# Patient Record
Sex: MALE | Race: WHITE
[De-identification: names, ages, dates, MRNs, and addresses within clinical notes are randomized per-mention and may not be internally consistent; named-entity substitution may affect disease eponyms.]

---

## 2020-02-18 ENCOUNTER — HOSPITAL ENCOUNTER (INPATIENT)
Dept: HOSPITAL 12 - ER | Age: 75
LOS: 22 days | Discharge: NURSING, REHAB OR LONG-TERM CARE | DRG: 885 | End: 2020-03-11
Payer: MEDICARE

## 2020-02-18 VITALS — DIASTOLIC BLOOD PRESSURE: 74 MMHG | SYSTOLIC BLOOD PRESSURE: 139 MMHG

## 2020-02-18 VITALS — WEIGHT: 137 LBS | HEIGHT: 60 IN | BODY MASS INDEX: 26.9 KG/M2

## 2020-02-18 DIAGNOSIS — G30.9: ICD-10-CM

## 2020-02-18 DIAGNOSIS — Z79.899: ICD-10-CM

## 2020-02-18 DIAGNOSIS — F01.50: ICD-10-CM

## 2020-02-18 DIAGNOSIS — N17.9: ICD-10-CM

## 2020-02-18 DIAGNOSIS — Z91.19: ICD-10-CM

## 2020-02-18 DIAGNOSIS — E66.9: ICD-10-CM

## 2020-02-18 DIAGNOSIS — F31.9: Primary | ICD-10-CM

## 2020-02-18 DIAGNOSIS — F12.10: ICD-10-CM

## 2020-02-18 DIAGNOSIS — F02.80: ICD-10-CM

## 2020-02-18 DIAGNOSIS — Z87.828: ICD-10-CM

## 2020-02-18 DIAGNOSIS — G40.909: ICD-10-CM

## 2020-02-18 DIAGNOSIS — E55.9: ICD-10-CM

## 2020-02-18 LAB
ALP SERPL-CCNC: 87 U/L (ref 50–136)
ALT SERPL W/O P-5'-P-CCNC: 16 U/L (ref 16–63)
AMORPH PHOS CRY URNS QL MICRO: (no result) /HPF
AMPHETAMINES UR QL SCN>1000 NG/ML: NEGATIVE
APAP SERPL-MCNC: < 2 UG/ML (ref 10–30)
APPEARANCE UR: (no result)
AST SERPL-CCNC: 17 U/L (ref 15–37)
BARBITURATES UR QL SCN: NEGATIVE
BASOPHILS # BLD AUTO: 0 K/UL (ref 0–8)
BASOPHILS NFR BLD AUTO: 0.5 % (ref 0–2)
BILIRUB DIRECT SERPL-MCNC: 0.2 MG/DL (ref 0–0.2)
BILIRUB SERPL-MCNC: 0.5 MG/DL (ref 0.2–1)
BILIRUB UR QL STRIP: NEGATIVE
BUN SERPL-MCNC: 13 MG/DL (ref 7–18)
CHLORIDE SERPL-SCNC: 101 MMOL/L (ref 98–107)
CO2 SERPL-SCNC: 30 MMOL/L (ref 21–32)
COCAINE UR QL SCN: NEGATIVE
COLOR UR: YELLOW
CREAT SERPL-MCNC: 1.1 MG/DL (ref 0.6–1.3)
DEPRECATED SQUAMOUS URNS QL MICRO: (no result) /HPF
EOSINOPHIL # BLD AUTO: 0.1 K/UL (ref 0–0.7)
EOSINOPHIL NFR BLD AUTO: 1 % (ref 0–7)
ETHANOL SERPL-MCNC: < 3 MG/DL (ref 0–0)
GLUCOSE SERPL-MCNC: 91 MG/DL (ref 74–106)
GLUCOSE UR STRIP-MCNC: NEGATIVE MG/DL
HCT VFR BLD AUTO: 41.6 % (ref 36.7–47.1)
HGB BLD-MCNC: 13.9 G/DL (ref 12.5–16.3)
HGB UR QL STRIP: NEGATIVE
KETONES UR STRIP-MCNC: NEGATIVE MG/DL
LEUKOCYTE ESTERASE UR QL STRIP: NEGATIVE
LYMPHOCYTES # BLD AUTO: 1.6 K/UL (ref 20–40)
LYMPHOCYTES NFR BLD AUTO: 23.5 % (ref 20.5–51.5)
MCH RBC QN AUTO: 27.8 UUG (ref 23.8–33.4)
MCHC RBC AUTO-ENTMCNC: 33 G/DL (ref 32.5–36.3)
MCV RBC AUTO: 83.3 FL (ref 73–96.2)
MONOCYTES # BLD AUTO: 0.5 K/UL (ref 2–10)
MONOCYTES NFR BLD AUTO: 7.1 % (ref 0–11)
NEUTROPHILS # BLD AUTO: 4.6 K/UL (ref 1.8–8.9)
NEUTROPHILS NFR BLD AUTO: 67.9 % (ref 38.5–71.5)
NITRITE UR QL STRIP: NEGATIVE
OPIATES UR QL SCN: NEGATIVE
PCP UR QL SCN>25 NG/ML: NEGATIVE
PH UR STRIP: 7 [PH] (ref 5–8)
PLATELET # BLD AUTO: 212 K/UL (ref 152–348)
POTASSIUM SERPL-SCNC: 3.6 MMOL/L (ref 3.5–5.1)
RBC # BLD AUTO: 5 MIL/UL (ref 4.06–5.63)
RBC #/AREA URNS HPF: (no result) /HPF (ref 0–3)
SP GR UR STRIP: 1.02 (ref 1–1.03)
THC UR QL SCN>50 NG/ML: POSITIVE
UROBILINOGEN UR STRIP-MCNC: 0.2 E.U./DL
WBC # BLD AUTO: 6.7 K/UL (ref 3.6–10.2)
WBC #/AREA URNS HPF: (no result) /HPF
WBC #/AREA URNS HPF: (no result) /HPF (ref 0–3)
WS STN SPEC: 7.4 G/DL (ref 6.4–8.2)

## 2020-02-18 PROCEDURE — G0480 DRUG TEST DEF 1-7 CLASSES: HCPCS

## 2020-02-18 PROCEDURE — A4663 DIALYSIS BLOOD PRESSURE CUFF: HCPCS

## 2020-02-18 NOTE — NUR
Patient BIB pvt ambulance [facility] for med cl to gps admission. A/Ox2 to self and 
place. Speech is clear, speaks in complete sentences. Patient is 
semi-cooperative. Upon assessment patient was refusing care, but upon asking 
another time patient agreed to care pending to be provided. Respiratory even 
and unlabored, no cough no sob. No cardiovascular distress noted, denies any 
cp. Denies any n/v/d.



Patient in bed at lowest position, sr upx2, call light within reach. Fall 
precautions implemented per protocol.

## 2020-02-18 NOTE — NUR
Code grey called on patient d/t his attempt to elope from the facility. Patient 
tried leaving through the front door entrance of ER.

## 2020-02-18 NOTE — NUR
Patient has been seen leaving his room multiple times. Instructed patient to 
remain in his room, but pt continues to go in and out and wander. Nursing 
supervisor notified, no available sitters at this time.

## 2020-02-18 NOTE — NUR
hand off and SBAR received fr outgoing day shift rn (lanette)

pt is asleep 

w/ sitter at bedside 

pt is at RA

NAD

5150 in place

## 2020-02-18 NOTE — NUR
GPS ADMISSION NOTE:

 AT APPROX 2000, ADMITTED 75 YEARS OLD MALE FROM [facility]) TO [facility] 
MHU ON A 5150 FOR GD. PER HOLD, PATIENT HAS BEEN YELLING AT RESIDENTS AND STAFF, REFUSING TO 
TAKE HIS PSYCHOTROPIC MEDICATIONS AND HAS BEEN THREATENING 2 FEMALE RESIDENTS. PT DENIED, 
STATING, "THEY ARE LYING" AND MAKES OTHER PARANOID REFERENCE TO DOCTORS, AND THE "VEINS" 
HERE "I DON'T TRUST". PATIENT WAS MEDICALLY CLEARED AT [facility] WHERE HE TESTED POSITIVE 
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 PT WAS ASLO GIVEN ZYPREXA 10MG IM D/T ATTEMPTING TO ELOPE THE ER 
AND FOR AGITATION. HOLD WILL BE UP ON 2/21/2020 AT 1030. PT WAS ADMITTED UNDER DR. COSTELLO'S 
CARE.

UPON ADMISSION TO MHU, PATIENT WAS NOTED A/O X 1, CONFUSED, POOR HISTORIAN. APPEARS 
PREOCCUPIED, AND SUSPICIOUS. HE EXPRESSES DESIRE TO LEAVE THE UNIT. 

FACE TO FACE ASSESSMENT DONE, PATIENT APPEARS TO REFLECT WHAT IS WRITTEN ON THE HOLD. HE 
DENIES YELLING AT STAFF AND RESIDENTS AT HIS FACILITY.  MOOD IS ANXIOUS, AFFECT IS GUARDED, 
APPEARANCE IS POOR HYGIENE (DIRTY UNKEPT FINGERNAILS) AND BEHAVIOR IS SUSPICIOUS. 

PT WAS UNABLE TO SIGN ANY OF HIS ADMISSION PAPERS D/T CONFUSED AND AMS.

SKIN ASSESSMENT WAS LIMITED D/T PT POOR COMPLIANT; HOWEVER, PATIENT WAS NOTED WITH SWOLLEN 
LEFT GROIN AREA, POSSIBLE SWOLLEN LEFT TESTICLE. WILL F/U WITH PATIENT MEDICAL HISTORY. 

PATIENT'S RIGHTS HANDBOOK AND ADVISEMENT WAS GIVEN. PT WAS ORIENTED TO UNIT RULES, PHONE, 
RESTROOM, AND ROOM MATE. Q15 MIN HEAD CHECKS WAS INITIATED FOR SAFETY AND AWOL PRECAUTION.

## 2020-02-19 VITALS — SYSTOLIC BLOOD PRESSURE: 98 MMHG | DIASTOLIC BLOOD PRESSURE: 52 MMHG

## 2020-02-19 VITALS — DIASTOLIC BLOOD PRESSURE: 70 MMHG | SYSTOLIC BLOOD PRESSURE: 123 MMHG

## 2020-02-19 RX ADMIN — Medication SCH MG: at 20:37

## 2020-02-19 RX ADMIN — DIVALPROEX SODIUM SCH MG: 125 CAPSULE ORAL at 12:13

## 2020-02-19 RX ADMIN — DIVALPROEX SODIUM SCH MG: 125 CAPSULE ORAL at 20:37

## 2020-02-19 RX ADMIN — LORAZEPAM PRN MG: 0.5 TABLET ORAL at 13:19

## 2020-02-19 NOTE — NUR
Social Work/Coordination of Care:



 spoke with Angela  at [facility] 
([phone]) who confirmed that the patient will be able to return once stable and ready 
for discharge.

## 2020-02-19 NOTE — NUR
Social Work/Family Contact:



SW spoke with patient's daughter/DPJF Smith and collected collateral information 
regarding patient's history and recent behaviors.

## 2020-02-19 NOTE — NUR
Social Work/Initial Discharge Note:



Patient currently resides at [address], 
[place], [place] ([phone]). Patient will 

return upon discharge. Patient's daughter/DPOA Vidhi Smith ([phone]) is involved in 
the patient's treatment plan and care. SW will continue to work with patient, family, and MD 
to ensure a safe and proper discharge plan.

## 2020-02-19 NOTE — NUR
Received patient in his room, med compliant but takes his time, paranoid, guarded, 
isolative, and avoidant. AO x 2. No sign or symptom of resp. distress, no indication of pain 
or discomfort. Will continue to monitor patient for safety.

## 2020-02-20 VITALS — SYSTOLIC BLOOD PRESSURE: 135 MMHG | DIASTOLIC BLOOD PRESSURE: 87 MMHG

## 2020-02-20 VITALS — DIASTOLIC BLOOD PRESSURE: 77 MMHG | SYSTOLIC BLOOD PRESSURE: 126 MMHG

## 2020-02-20 VITALS — SYSTOLIC BLOOD PRESSURE: 131 MMHG | DIASTOLIC BLOOD PRESSURE: 80 MMHG

## 2020-02-20 RX ADMIN — Medication SCH MG: at 20:46

## 2020-02-20 RX ADMIN — DIVALPROEX SODIUM SCH MG: 125 CAPSULE ORAL at 20:45

## 2020-02-20 RX ADMIN — LORAZEPAM PRN MG: 0.5 TABLET ORAL at 16:28

## 2020-02-20 RX ADMIN — CYANOCOBALAMIN TAB 1000 MCG SCH MCG: 1000 TAB at 08:31

## 2020-02-20 RX ADMIN — DIVALPROEX SODIUM SCH MG: 125 CAPSULE ORAL at 08:30

## 2020-02-20 NOTE — NUR
Received patient in bed. Alert but disoriented. Irritable when asked questions. Very 
confused about environment and plan. Reorientation given multiple times. Later patient noted 
pacing the halls. At one point getting in a small altercation with another patient sitting 
in the mable chair. Frequent, repetitive questions asked and inability to remember where room 
is. Patient has been medication compliant so far this shift. Continuing to monitor for 
safety and redirect and reorient as needed. No acute issues at this time.

## 2020-02-20 NOTE — NUR
Social Work Individual therapy Note:



 met with patient for individual supportive counseling. Patient continues to 
remain socially withdrawn, however with encouragement by this writer, patient agreed to 
participate in group therapy and engage in a meaningful conversation with this SW. Patient 
was able to share life history and concerns he has regarding his current situation and 
hospitalization. Patient shared his love for his daughter and his passion for dogs and 
children.  provided active listening and positive reinforcement.  
will continue to meet with patient and provide ongoing support.

## 2020-02-20 NOTE — NUR
aaox1 forgetful at times. ambulatory ad asha. VSS. needs attended.

fall precautions maintained. VSS. Compliant with meds. Continent

of bowel and bladder. No signs of agitation or restlessness. Patient

calm and cooperative.

## 2020-02-21 VITALS — DIASTOLIC BLOOD PRESSURE: 79 MMHG | SYSTOLIC BLOOD PRESSURE: 144 MMHG

## 2020-02-21 VITALS — DIASTOLIC BLOOD PRESSURE: 74 MMHG | SYSTOLIC BLOOD PRESSURE: 115 MMHG

## 2020-02-21 VITALS — DIASTOLIC BLOOD PRESSURE: 84 MMHG | SYSTOLIC BLOOD PRESSURE: 137 MMHG

## 2020-02-21 RX ADMIN — CYANOCOBALAMIN TAB 1000 MCG SCH MCG: 1000 TAB at 08:30

## 2020-02-21 RX ADMIN — DIVALPROEX SODIUM SCH MG: 125 CAPSULE ORAL at 20:12

## 2020-02-21 RX ADMIN — LORAZEPAM PRN MG: 0.5 TABLET ORAL at 18:07

## 2020-02-21 RX ADMIN — LORAZEPAM PRN MG: 0.5 TABLET ORAL at 11:11

## 2020-02-21 RX ADMIN — DIVALPROEX SODIUM SCH MG: 125 CAPSULE ORAL at 08:29

## 2020-02-21 RX ADMIN — Medication SCH MG: at 08:40

## 2020-02-21 RX ADMIN — Medication SCH MG: at 16:10

## 2020-02-21 NOTE — NUR
GPS: received patient walking in the hallway, patient AOx2-3, denies any SI and HI, patient 
coud not recall the event why he was here, patient spoke with his daughter on the phone, 
started to become agitated and pacing, oral PRN was Given and patient was redirected to his 
room, patient remain confused and needs redirection, shaved patient beard and patient 
appreciated it

## 2020-02-21 NOTE — NUR
RECEIVED PATIENT IN THE HALLWAY. HE IS NOTED A/O X 2 BUT FORGETFUL AT TIMES. HE IS NOTED 
PACING THE HALLWAY, AND WONDERING AROUND THE UNIT. PT REQUIRED CONSTANT REORIENTATION TO 
TIME, PLACE, AND SITUATION. NO AGGRESSIVE OR COMBATIVE  BX NOTED AT THIS TIME. SPEECH IS 
DISORGANIZED, AFFECT GUARDED. PATIENT IS ABLE TO COMPLY WITH MEDICATION REGIMENT AT THIS 
TIME. FOOD AND PO FLUIDS WERE PROVIDED. V/S STABLE. PATIENT IN REASSURED FOR HIS SAFETY. 
SAFETY AND FALL PRECAUTION IN PLACE. WILL CONTINUE TO MONITOR.

## 2020-02-21 NOTE — NUR
End of shift note: Quiet night. Slept well throughout the night.

No acute distress noted. VSS.Continent of bowel and bladder.

No BM noted this shift. Hadv 7hrs & 30 minutes of sleep. No 

agitation nor any behavioral issues noted. Will monitor patient.

## 2020-02-21 NOTE — NUR
Social Work Firearms Report ([employer]):

 completed and submitted a [employer] firearms report for 5150 grave disability 
certification. A copy of report has been placed in patient chart.

## 2020-02-22 VITALS — DIASTOLIC BLOOD PRESSURE: 50 MMHG | SYSTOLIC BLOOD PRESSURE: 101 MMHG

## 2020-02-22 VITALS — DIASTOLIC BLOOD PRESSURE: 84 MMHG | SYSTOLIC BLOOD PRESSURE: 135 MMHG

## 2020-02-22 VITALS — DIASTOLIC BLOOD PRESSURE: 85 MMHG | SYSTOLIC BLOOD PRESSURE: 135 MMHG

## 2020-02-22 RX ADMIN — DIVALPROEX SODIUM SCH MG: 125 CAPSULE ORAL at 20:13

## 2020-02-22 RX ADMIN — CYANOCOBALAMIN TAB 1000 MCG SCH MCG: 1000 TAB at 09:13

## 2020-02-22 RX ADMIN — Medication SCH MG: at 16:32

## 2020-02-22 RX ADMIN — DIVALPROEX SODIUM SCH MG: 125 CAPSULE ORAL at 09:13

## 2020-02-22 RX ADMIN — Medication SCH MG: at 09:14

## 2020-02-23 VITALS — SYSTOLIC BLOOD PRESSURE: 108 MMHG | DIASTOLIC BLOOD PRESSURE: 71 MMHG

## 2020-02-23 VITALS — DIASTOLIC BLOOD PRESSURE: 72 MMHG | SYSTOLIC BLOOD PRESSURE: 132 MMHG

## 2020-02-23 VITALS — SYSTOLIC BLOOD PRESSURE: 133 MMHG | DIASTOLIC BLOOD PRESSURE: 83 MMHG

## 2020-02-23 LAB
ALP SERPL-CCNC: 101 U/L (ref 50–136)
ALT SERPL W/O P-5'-P-CCNC: 22 U/L (ref 16–63)
AST SERPL-CCNC: 12 U/L (ref 15–37)
BASOPHILS # BLD AUTO: 0 K/UL (ref 0–8)
BASOPHILS NFR BLD AUTO: 0.6 % (ref 0–2)
BILIRUB SERPL-MCNC: 0.9 MG/DL (ref 0.2–1)
BUN SERPL-MCNC: 16 MG/DL (ref 7–18)
CHLORIDE SERPL-SCNC: 104 MMOL/L (ref 98–107)
CO2 SERPL-SCNC: 30 MMOL/L (ref 21–32)
CREAT SERPL-MCNC: 1.1 MG/DL (ref 0.6–1.3)
EOSINOPHIL # BLD AUTO: 0.1 K/UL (ref 0–0.7)
EOSINOPHIL NFR BLD AUTO: 2 % (ref 0–7)
GLUCOSE SERPL-MCNC: 84 MG/DL (ref 74–106)
HCT VFR BLD AUTO: 38 % (ref 36.7–47.1)
HGB BLD-MCNC: 12.7 G/DL (ref 12.5–16.3)
LYMPHOCYTES # BLD AUTO: 1.6 K/UL (ref 20–40)
LYMPHOCYTES NFR BLD AUTO: 29.7 % (ref 20.5–51.5)
MCH RBC QN AUTO: 27.7 UUG (ref 23.8–33.4)
MCHC RBC AUTO-ENTMCNC: 34 G/DL (ref 32.5–36.3)
MCV RBC AUTO: 82.6 FL (ref 73–96.2)
MONOCYTES # BLD AUTO: 0.4 K/UL (ref 2–10)
MONOCYTES NFR BLD AUTO: 7.7 % (ref 0–11)
NEUTROPHILS # BLD AUTO: 3.2 K/UL (ref 1.8–8.9)
NEUTROPHILS NFR BLD AUTO: 60 % (ref 38.5–71.5)
PLATELET # BLD AUTO: 162 K/UL (ref 152–348)
POTASSIUM SERPL-SCNC: 3.9 MMOL/L (ref 3.5–5.1)
RBC # BLD AUTO: 4.6 MIL/UL (ref 4.06–5.63)
VALPROATE SERPL-MCNC: 47 UG/ML (ref 50–100)
WBC # BLD AUTO: 5.3 K/UL (ref 3.6–10.2)
WS STN SPEC: 6.6 G/DL (ref 6.4–8.2)

## 2020-02-23 RX ADMIN — Medication SCH MG: at 08:28

## 2020-02-23 RX ADMIN — LORAZEPAM PRN MG: 0.5 TABLET ORAL at 22:37

## 2020-02-23 RX ADMIN — Medication SCH MG: at 17:35

## 2020-02-23 RX ADMIN — DIVALPROEX SODIUM SCH MG: 125 CAPSULE ORAL at 08:28

## 2020-02-23 RX ADMIN — LORAZEPAM PRN MG: 0.5 TABLET ORAL at 14:09

## 2020-02-23 RX ADMIN — CYANOCOBALAMIN TAB 1000 MCG SCH MCG: 1000 TAB at 08:28

## 2020-02-23 NOTE — NUR
Patient anxious and intrusive. Ativan 0.5 given per order. Constant redirection given to 
this patient. Continuing to monitor behavior and for safety.

## 2020-02-23 NOTE — NUR
GPS: Remain calm and cooperative with meds and care.Slept 7:30 hrs throughout the night.

No acute distress noted. Continent of bowel and bladder. No 

agitation nor any behavioral issues noted. Will monitor patient.

## 2020-02-23 NOTE — NUR
Received patient standing in doorway of room. Angry and irritable. Much encouragement needed 
to take medications. Patient pacing in hallway, very confused. Speech unclear and garbled . 
Reorientation, reassurance and redirection given . Vs are normal. Monitoring for behavior 
issues, escalation of anger or increase in confusion. Continuing to provide a safe 
environment.

## 2020-02-23 NOTE — NUR
patient is very agitated and banging on the door. uncooperative with staff.going to other 
patient room's. ativan 0.5 mg po given for agitation.

## 2020-02-24 VITALS — DIASTOLIC BLOOD PRESSURE: 82 MMHG | SYSTOLIC BLOOD PRESSURE: 140 MMHG

## 2020-02-24 VITALS — SYSTOLIC BLOOD PRESSURE: 141 MMHG | DIASTOLIC BLOOD PRESSURE: 96 MMHG

## 2020-02-24 VITALS — DIASTOLIC BLOOD PRESSURE: 72 MMHG | SYSTOLIC BLOOD PRESSURE: 132 MMHG

## 2020-02-24 VITALS — DIASTOLIC BLOOD PRESSURE: 70 MMHG | SYSTOLIC BLOOD PRESSURE: 118 MMHG

## 2020-02-24 RX ADMIN — Medication SCH MG: at 17:14

## 2020-02-24 RX ADMIN — LORAZEPAM PRN MG: 0.5 TABLET ORAL at 11:03

## 2020-02-24 RX ADMIN — Medication SCH MG: at 08:41

## 2020-02-24 RX ADMIN — CYANOCOBALAMIN TAB 1000 MCG SCH MCG: 1000 TAB at 08:41

## 2020-02-24 RX ADMIN — TEMAZEPAM PRN MG: 7.5 CAPSULE ORAL at 00:28

## 2020-02-24 RX ADMIN — DIVALPROEX SODIUM SCH MG: 125 CAPSULE ORAL at 08:41

## 2020-02-24 RX ADMIN — LORAZEPAM PRN MG: 0.5 TABLET ORAL at 17:29

## 2020-02-24 NOTE — NUR
ORDER TO COLLECT URINE, TRIED NOT SUCCESSFULL, WILL TRY AGAIN, AND  ENDORSE TO NEXT SHIFT 
ACCORDINGLY

## 2020-02-24 NOTE — NUR
PATIENT WITH UNSTEADY GAIT, PACES IN THE HALLWAY BACK AND FORTH, COMPLIANT WITH MEDS, 
CONFUSE, ORIENTED AT TIMES, MONITORED FOR SAFETY CLOSELY, HIGH RISK TO FALL, SAFETY 
PRECAUTIONS  ARE IN PLACE

## 2020-02-24 NOTE — NUR
RECEIVED PATIENT IN HIS ROOM SITTING IN HIS BED. HE IS NOTED A/O X 1 (NAME ONLY) CONFUSED, 
DISORGANIZED, FLAT AFFECT, LABILE MOOD. HE IS POOR HISTORIAN, UNABLE TO HAVE A MEANINGFUL 
CONVERSATION. PATIENT NOTED RESPONDING TO INTERNAL STIMULI. V/S STABLE AT THIS TIME. PATIENT 
IS REASSURED FOR HIS SAFETY. SAFETY AND FALL PRECAUTION IN PLACE. WILL CONTINUE TO MONITOR.

## 2020-02-24 NOTE — NUR
Social Work Individual Therapy Note:



 met with patient today to provide brief individual supportive counseling. 
Patient presents guarded and withdrawn.  encouraged patient to engage in group 
and the milieu, however, patient refused and did not attend group.  prompted 
patient on the importance of talking and getting to know others, however, patient presented 
mentally pre-occupied. Patient sated, "Not now I have another problem", but unable to say 
what when asked.  will remain available to patient and continue to provide 
supportive counseling.

## 2020-02-24 NOTE — NUR
GPS: PATIENT FOUND ON THE FLOOR.

AT APPROX 2045,THIS WRITER FOUND PATIENT SITTING ON THE FLOOR IN HIS ROOM NEXT TO THE YELLOW 
CHAIR THAT IS LOCATED BY HIS BED. PT WAS HELPED TO A STANDING POSITION THEN TO HIS BED. UPON 
INTERVIEW, PATIENT WAS UNABLE TO DESCRIBE WHY HE WAS ON THE FLOOR OR IF HE HAD A FALL. HEAD 
TO TOE ASSESSMENT WAS DONE. NO BRUISES, NO REDNESS, NO SWELLING, NO DEFORMITIES WERE NOTED. 
PATIENT NOTED CALM AND COOPERATIVE WITH NO CHANGES IN LOC AND IN NO DISTRESS, HE DENIED 
PAIN, AND NO FACIAL GRIMACE WERE NOTED. V/S: B/P 117/70 MMHG; PULSE 84BPM; O2SAT 99%. UNABLE 
TO DO ORTHOSTATIC BP D/T PATIENT NOT FOLLOWING PROPER DIRECTIONS. PATIENT WAS THEN PLACE IN 
A DEWEY CHAIR NEXT TO THE NURSING STATION. AT APPROX. 2111, HOUSE SUPERVISOR WAS NOTIFIED OF 
PATIENT FOUND ON THE FLOOR IN HIS ROOM. DR COSTELLO WAS NOTIFIED AT APPROX. 2112 AND NEW 
ORDER OBTAINED TO DO A CT BRAIN WITHOUT CONTRAST, TO OBTAINED URINE FOR URINALYSIS AND C&S 
AND IF NECESSARY, TO DO A STRAIGHT CATH AS PATIENT WILL PERMIT, TO PLACE PATIENT ON 1:1 
SUPERVISION AND TO HOLD RISPERDAL 1MG QHS. ORDERS WERE NOTED. A MESSAGE WAS LEFT TO DR LEIGH AT APPROX 2115 TO CALL US BACK. AT APPROX. 2116, A MESSAGE WAS LEFT TO PATIENT'S 
DAUGHTER MAIK TO CALL US BACK. AT 2155, DR LEIGH RETURNED CALL TO THIS WRITER AND HE 
WAS NOTIFIED OF PATIENT'S FOUND ON FLOOR AND DR. COSTELLO'S ORDERS. AT APPROX. 2225, MAIK 
RETUNED CALL TO THIS WRITER AND SHE WAS ALSO NOTIFIED OF PATIENT FOUND ON THE FLOOR, NEW 
ORDERS AND RESULTS CT ALIYAH WITH NEGATIVE RESULTS. PATIENT IN NO DISTRESS, DENIED PAIN AT 
THIS TIME AND NO CHANGES IN LOC. CT BRAIN NEGATIVE FOR ACUTE TRAUMA. WILL CONTINUE TO 
MONITOR.

## 2020-02-24 NOTE — NUR
Social Work Family Contact:



NADIA received a voicemail from patient's daughter/DPOA Vidhi Luis requesting updates about 
the patient's treatment plan. Vidhi stated she has not heard from anyone from the hospital 
regarding what is going on with the patient. NADIA returned Vidhi's phone call but unable to 
reach, left a voicemail for call back.

## 2020-02-24 NOTE — NUR
GPS: Remain calm and cooperative with meds and care.Slept 2 hrs throughout the night. after 
restoril 7.5 mg po given.no acute distress noted at this time. Continent of bowel and 
bladder. No 

agitation nor any behavioral issues noted. Will monitor patient.

## 2020-02-25 VITALS — SYSTOLIC BLOOD PRESSURE: 127 MMHG | DIASTOLIC BLOOD PRESSURE: 85 MMHG

## 2020-02-25 VITALS — DIASTOLIC BLOOD PRESSURE: 77 MMHG | SYSTOLIC BLOOD PRESSURE: 137 MMHG

## 2020-02-25 VITALS — DIASTOLIC BLOOD PRESSURE: 78 MMHG | SYSTOLIC BLOOD PRESSURE: 118 MMHG

## 2020-02-25 LAB
ALP SERPL-CCNC: 113 U/L (ref 50–136)
ALT SERPL W/O P-5'-P-CCNC: 24 U/L (ref 16–63)
AST SERPL-CCNC: 17 U/L (ref 15–37)
BASOPHILS # BLD AUTO: 0 K/UL (ref 0–8)
BASOPHILS NFR BLD AUTO: 0.5 % (ref 0–2)
BILIRUB SERPL-MCNC: 1 MG/DL (ref 0.2–1)
BUN SERPL-MCNC: 22 MG/DL (ref 7–18)
CHLORIDE SERPL-SCNC: 107 MMOL/L (ref 98–107)
CO2 SERPL-SCNC: 30 MMOL/L (ref 21–32)
CREAT SERPL-MCNC: 0.9 MG/DL (ref 0.6–1.3)
EOSINOPHIL # BLD AUTO: 0.1 K/UL (ref 0–0.7)
EOSINOPHIL NFR BLD AUTO: 1.8 % (ref 0–7)
GLUCOSE SERPL-MCNC: 95 MG/DL (ref 74–106)
HCT VFR BLD AUTO: 38.2 % (ref 36.7–47.1)
HGB BLD-MCNC: 12.8 G/DL (ref 12.5–16.3)
LYMPHOCYTES # BLD AUTO: 1.5 K/UL (ref 20–40)
LYMPHOCYTES NFR BLD AUTO: 27.8 % (ref 20.5–51.5)
MCH RBC QN AUTO: 28.2 UUG (ref 23.8–33.4)
MCHC RBC AUTO-ENTMCNC: 34 G/DL (ref 32.5–36.3)
MCV RBC AUTO: 83.9 FL (ref 73–96.2)
MONOCYTES # BLD AUTO: 0.5 K/UL (ref 2–10)
MONOCYTES NFR BLD AUTO: 9.2 % (ref 0–11)
NEUTROPHILS # BLD AUTO: 3.2 K/UL (ref 1.8–8.9)
NEUTROPHILS NFR BLD AUTO: 60.7 % (ref 38.5–71.5)
PLATELET # BLD AUTO: 159 K/UL (ref 152–348)
POTASSIUM SERPL-SCNC: 4 MMOL/L (ref 3.5–5.1)
RBC # BLD AUTO: 4.55 MIL/UL (ref 4.06–5.63)
WBC # BLD AUTO: 5.3 K/UL (ref 3.6–10.2)
WS STN SPEC: 7 G/DL (ref 6.4–8.2)

## 2020-02-25 RX ADMIN — OXCARBAZEPINE SCH MG: 150 TABLET, FILM COATED ORAL at 16:39

## 2020-02-25 RX ADMIN — LORAZEPAM PRN MG: 0.5 TABLET ORAL at 07:37

## 2020-02-25 RX ADMIN — CYANOCOBALAMIN TAB 1000 MCG SCH MCG: 1000 TAB at 08:05

## 2020-02-25 RX ADMIN — TEMAZEPAM PRN MG: 7.5 CAPSULE ORAL at 22:03

## 2020-02-25 RX ADMIN — ACETAMINOPHEN PRN MG: 325 TABLET ORAL at 12:55

## 2020-02-25 RX ADMIN — TEMAZEPAM PRN MG: 7.5 CAPSULE ORAL at 01:12

## 2020-02-25 RX ADMIN — Medication SCH MG: at 08:05

## 2020-02-25 RX ADMIN — DIVALPROEX SODIUM SCH MG: 125 CAPSULE ORAL at 08:05

## 2020-02-25 RX ADMIN — LORAZEPAM PRN MG: 0.5 TABLET ORAL at 12:55

## 2020-02-25 RX ADMIN — Medication SCH MG: at 16:03

## 2020-02-25 NOTE — NUR
GPS: Nursing Notes: Refusing For Picture to be Taken:

Patient is confused, impaired judgment, resistant with nursing care, refusing for picture to 
be taken of sacral area redness at this time, applying Z-Guard cream to affected area, 
continue to monitor for safety, continue with treatment plan.

## 2020-02-25 NOTE — NUR
GPS: Nursing Notes: Thought Disorder:

Patient awake and responding to his name, trying to climb over the side rails this am, 
impaired judgment, poor insight, confused, resistant with nursing care, unsteady gait, 
disorganized, internally preoccupied, episodes of talking incoherently, unable to formulate 
a viable plan for self care, assisted with ADL's, constantly redirected and reoriented 
during shift, continue to monitor for safety, continue with treatment plan.

## 2020-02-25 NOTE — NUR
UNABLE TO COLLECT URINE SAMPLE, UNABLE TO DO STRAIGHT CATH FOR UA, PATIENT REFUSED AND WAS 
UNCOOPERATIVE, UNABLE TO REDIRECT. PT ON 1:1. WILL ATTEMPT TO COLLECT URINE LATER. WILL 
CONTINUE TO MONITOR.

## 2020-02-25 NOTE — NUR
Received pt in mable- chair for safety. AAO x1. No acute distress noted. No facial cues for 
pain noted. Due med given as ordered, tolerated well. Pt noted to be mumbling incoherently. 
Safety measures maintained. Will continue to monitor.

## 2020-02-26 VITALS — SYSTOLIC BLOOD PRESSURE: 137 MMHG | DIASTOLIC BLOOD PRESSURE: 78 MMHG

## 2020-02-26 VITALS — SYSTOLIC BLOOD PRESSURE: 109 MMHG | DIASTOLIC BLOOD PRESSURE: 61 MMHG

## 2020-02-26 LAB
AMORPH PHOS CRY URNS QL MICRO: (no result) /HPF
APPEARANCE UR: (no result)
BILIRUB UR QL STRIP: NEGATIVE
CAOX CRY URNS QL MICRO: (no result) /HPF
COLOR UR: YELLOW
DEPRECATED SQUAMOUS URNS QL MICRO: (no result) /HPF
GLUCOSE UR STRIP-MCNC: NEGATIVE MG/DL
HGB UR QL STRIP: NEGATIVE
KETONES UR STRIP-MCNC: (no result) MG/DL
LEUKOCYTE ESTERASE UR QL STRIP: NEGATIVE
MUCOUS THREADS URNS QL MICRO: (no result) /LPF
NITRITE UR QL STRIP: NEGATIVE
PH UR STRIP: 7 [PH] (ref 5–8)
RBC #/AREA URNS HPF: (no result) /HPF (ref 0–3)
SP GR UR STRIP: 1.02 (ref 1–1.03)
UROBILINOGEN UR STRIP-MCNC: 0.2 E.U./DL
WBC #/AREA URNS HPF: (no result) /HPF
WBC #/AREA URNS HPF: (no result) /HPF (ref 0–3)

## 2020-02-26 RX ADMIN — LORAZEPAM PRN MG: 0.5 TABLET ORAL at 21:04

## 2020-02-26 RX ADMIN — OXCARBAZEPINE SCH MG: 150 TABLET, FILM COATED ORAL at 08:45

## 2020-02-26 RX ADMIN — CYANOCOBALAMIN TAB 1000 MCG SCH MCG: 1000 TAB at 08:45

## 2020-02-26 RX ADMIN — TEMAZEPAM PRN MG: 7.5 CAPSULE ORAL at 22:45

## 2020-02-26 RX ADMIN — LORAZEPAM PRN MG: 0.5 TABLET ORAL at 13:27

## 2020-02-26 RX ADMIN — OXCARBAZEPINE SCH MG: 300 TABLET, FILM COATED ORAL at 16:42

## 2020-02-26 NOTE — NUR
Received patient in bed. VS are stable. Medication compliant. Patient is very confused and 
disoriented. Gait noted unsteady. Assisted patient to the bathroom but patient did not know 
what to do when inside. Up to mable chair at the nurses station for breakfast and for 
safety.Continuing to reorient patient to the environment. Unable to have any meaningful 
conversation this am. Will monitor for safety and encourage participation and interaction 
with a reality based focus. Patient is anxious and restless. Frequent rounding done.

## 2020-02-26 NOTE — NUR
PATIENT NOTED RESTLESS, TALKING TO HIMSELF, FLIGHT OF IDEAS. HE WAS TAKEN TO THE BATHROOM. 
PT NOTED WALKING WITH UNSTEADY GAIT, REQUIRED ASSISTANCE WITH ADLs. TEMAZEPAM 7.5MG PO PRN 
WAS GIVEN FOR INSOMNIA. WILL CONTINUE TO MONITOR.

## 2020-02-26 NOTE — NUR
RECEIVED PATIENT IN THE HALLWAY SITTING IN A DEWEY CHAIR NEAR THE NURSING STATION. HE IS 
NOTED A/O X 1 (NAME ONLY) DISORGANIZED SPEECH, FLIGHT OF IDEAS; MOOD IS LABILE, AFFECT IS 
BLUNTED. PATIENT UNABLE TO HAVE A MEANINGFUL CONVERSATION. V/S STABLE AT THIS TIME. FOOD AND 
PO FLUIDS GIVEN. SAFETY AND FALL PRECAUTION IN PLACE. WILL CONTINUE TO MONITOR.

## 2020-02-27 VITALS — SYSTOLIC BLOOD PRESSURE: 115 MMHG | DIASTOLIC BLOOD PRESSURE: 57 MMHG

## 2020-02-27 VITALS — DIASTOLIC BLOOD PRESSURE: 88 MMHG | SYSTOLIC BLOOD PRESSURE: 130 MMHG

## 2020-02-27 RX ADMIN — LORAZEPAM PRN MG: 0.5 TABLET ORAL at 16:57

## 2020-02-27 RX ADMIN — OLANZAPINE SCH MG: 2.5 TABLET ORAL at 20:40

## 2020-02-27 RX ADMIN — OXCARBAZEPINE SCH MG: 300 TABLET, FILM COATED ORAL at 11:23

## 2020-02-27 RX ADMIN — CYANOCOBALAMIN TAB 1000 MCG SCH MCG: 1000 TAB at 09:00

## 2020-02-27 RX ADMIN — TEMAZEPAM PRN MG: 7.5 CAPSULE ORAL at 22:28

## 2020-02-27 RX ADMIN — LORAZEPAM PRN MG: 0.5 TABLET ORAL at 11:22

## 2020-02-27 RX ADMIN — ACETAMINOPHEN PRN MG: 325 TABLET ORAL at 20:59

## 2020-02-27 RX ADMIN — OXCARBAZEPINE SCH MG: 300 TABLET, FILM COATED ORAL at 16:57

## 2020-02-27 RX ADMIN — OXCARBAZEPINE SCH MG: 300 TABLET, FILM COATED ORAL at 09:00

## 2020-02-27 RX ADMIN — LORAZEPAM PRN MG: 0.5 TABLET ORAL at 20:58

## 2020-02-27 NOTE — NUR
Received patient trying to climb out of the bed. Patient is very combative striking at the 
staff trying to provide help. Refused VS , breakfast and am medications. Patient yelling and 
cursing at the staff and wont put a shirt on. Calm speech, quiet environment, distraction 
and encouragement given with minimal response. Continuing to provide safety, and monitoring 
for further behavior escalation.

## 2020-02-27 NOTE — NUR
Social Work Individual Therapy Note:



 met with patient today to provide brief individual supportive counseling. 
Patient presents with labile mood and agitated affect. Sw tried to redirect the patient and 
encourage to participate in group or assist the patient outside for some sunlight. Patient 
yelled at this writer and said, "go away". Patient was hyperverbal and loud. Patient is 
observed taking off his shirt and being physically  aggressive.  will remain 
available to patient and continue to provide supportive counseling as possible and needed.

## 2020-02-28 VITALS — SYSTOLIC BLOOD PRESSURE: 122 MMHG | DIASTOLIC BLOOD PRESSURE: 81 MMHG

## 2020-02-28 RX ADMIN — CYANOCOBALAMIN TAB 1000 MCG SCH MCG: 1000 TAB at 09:57

## 2020-02-28 RX ADMIN — OXCARBAZEPINE SCH MG: 300 TABLET, FILM COATED ORAL at 16:33

## 2020-02-28 RX ADMIN — OLANZAPINE SCH MG: 2.5 TABLET ORAL at 09:57

## 2020-02-28 RX ADMIN — OXCARBAZEPINE SCH MG: 300 TABLET, FILM COATED ORAL at 09:57

## 2020-02-28 RX ADMIN — TEMAZEPAM PRN MG: 7.5 CAPSULE ORAL at 22:46

## 2020-02-28 RX ADMIN — OLANZAPINE SCH MG: 2.5 TABLET ORAL at 20:02

## 2020-02-28 RX ADMIN — LORAZEPAM PRN MG: 0.5 TABLET ORAL at 21:35

## 2020-02-28 RX ADMIN — OXCARBAZEPINE SCH MG: 300 TABLET, FILM COATED ORAL at 12:52

## 2020-02-28 NOTE — NUR
Received patient in bed, awake and verbally responsive. No signs of distress noted. No SOB. 
No complain of Pain or discomfort. No SI/HI noted. Will  continue to monitor.

## 2020-02-28 NOTE — NUR
Patient in bed sleeping, No signs of distress. patient with episode of pacing and confusion 
to the hallway,  frequent orientation provided. All medication taken as ordered. safety 
measures provided. Will endorse to Incoming Nurse.

## 2020-02-28 NOTE — NUR
PT SLEPT 8  HOURS. PT IN NO ACUTE DISTRESS. PT NEEDS REORIENTATION. SAFETY AND COMFORT 
PROVIDED. PRESCRIBED MEDICATION GIVEN AND PT TOLERATED IT WELL.  ATIVAN GIVEN AT 2058H FOR 
PT IS RESTLESS. WHEELING OUT HIS CYRUS-CHAIR IN THE HALLWAY. TYLENOL GIVEN ALSO AT 2059H AS 
PER PT SAID HE HAS GENERALIZED PAIN.  PT TOLERATED IT WELL. RESTORIL GIVEN AT 2228H AS PER 
PT REQUEST.  PT TOLERATED IT WELL.  ALL NEEDS ARE MET. WILL ENDORSE TO INCOMING NURSE FOR 
CONTINUITY OF CARE.

## 2020-02-28 NOTE — NUR
RECEIVED PATIENT IN THE HALLWAY, HE IS NOTED PACING THE HALLWAY. PATIENT CONTINUE CONFUSED, 
DISORGANIZED SPEECH, UNABLE TO HAVE A MEANINGFUL CONVERSATION, MOOD IS LABILE, AFFECT IS 
BLUNTED. PT IS SOMEWHAT REDIRECTABLE. V/S STABLE AT THIS TIME. SAFETY AND FALL PRECAUTION IN 
PLACE. WILL CONTINUE TO MONITOR.

## 2020-02-28 NOTE — NUR
PATIENT NOTED PACING THE HALLWAY, WONDERING THE UNIT. UNABLE TO STAY IN HIS BED. TEMAZEPAM 
7.5 MG PO PRN WAS GIVEN. WILL CONTINUE TO MONITOR.

## 2020-02-29 VITALS — SYSTOLIC BLOOD PRESSURE: 133 MMHG | DIASTOLIC BLOOD PRESSURE: 99 MMHG

## 2020-02-29 VITALS — SYSTOLIC BLOOD PRESSURE: 145 MMHG | DIASTOLIC BLOOD PRESSURE: 82 MMHG

## 2020-02-29 VITALS — DIASTOLIC BLOOD PRESSURE: 85 MMHG | SYSTOLIC BLOOD PRESSURE: 117 MMHG

## 2020-02-29 RX ADMIN — OXCARBAZEPINE SCH MG: 300 TABLET, FILM COATED ORAL at 13:14

## 2020-02-29 RX ADMIN — OLANZAPINE SCH MG: 2.5 TABLET ORAL at 08:37

## 2020-02-29 RX ADMIN — OXCARBAZEPINE SCH MG: 300 TABLET, FILM COATED ORAL at 08:37

## 2020-02-29 RX ADMIN — TEMAZEPAM PRN MG: 7.5 CAPSULE ORAL at 21:19

## 2020-02-29 RX ADMIN — OLANZAPINE SCH MG: 2.5 TABLET ORAL at 20:02

## 2020-02-29 RX ADMIN — OXCARBAZEPINE SCH MG: 300 TABLET, FILM COATED ORAL at 17:31

## 2020-02-29 RX ADMIN — LORAZEPAM PRN MG: 0.5 TABLET ORAL at 23:17

## 2020-02-29 RX ADMIN — CYANOCOBALAMIN TAB 1000 MCG SCH MCG: 1000 TAB at 08:37

## 2020-02-29 NOTE — NUR
Patient restless and walking through the hallway and getting in other patient's 
rooms.Re-oriented the patient to his own room,No agitative behavior noted. Patient still has 
difficulty following commands. Will contiue to monitor closely

## 2020-03-01 VITALS — DIASTOLIC BLOOD PRESSURE: 74 MMHG | SYSTOLIC BLOOD PRESSURE: 130 MMHG

## 2020-03-01 VITALS — SYSTOLIC BLOOD PRESSURE: 125 MMHG | DIASTOLIC BLOOD PRESSURE: 73 MMHG

## 2020-03-01 VITALS — SYSTOLIC BLOOD PRESSURE: 167 MMHG | DIASTOLIC BLOOD PRESSURE: 68 MMHG

## 2020-03-01 RX ADMIN — OXCARBAZEPINE SCH MG: 300 TABLET, FILM COATED ORAL at 16:18

## 2020-03-01 RX ADMIN — TEMAZEPAM PRN MG: 7.5 CAPSULE ORAL at 23:12

## 2020-03-01 RX ADMIN — OLANZAPINE SCH MG: 2.5 TABLET ORAL at 08:12

## 2020-03-01 RX ADMIN — CYANOCOBALAMIN TAB 1000 MCG SCH MCG: 1000 TAB at 08:12

## 2020-03-01 RX ADMIN — OXCARBAZEPINE SCH MG: 300 TABLET, FILM COATED ORAL at 08:12

## 2020-03-01 RX ADMIN — OLANZAPINE SCH MG: 2.5 TABLET ORAL at 20:49

## 2020-03-01 RX ADMIN — OXCARBAZEPINE SCH MG: 300 TABLET, FILM COATED ORAL at 12:16

## 2020-03-01 RX ADMIN — LORAZEPAM PRN MG: 0.5 TABLET ORAL at 13:49

## 2020-03-01 NOTE — NUR
PRN medications given , still was awake and restless during the shift. Slept for  only 45 
minutes .

## 2020-03-01 NOTE — NUR
Patient trying to climb out of chair. Continuously banging on the table. Distraction 
attempts made, took patient outside, walked patient, took to the bathroom. Patient still 
restless and agitated, calling people names. Medicated patient with a PRN and providing a 
calm environment to encourage some rest. Monitoring for safety.

## 2020-03-01 NOTE — NUR
PATIENT RECEIVED  INTO CARE, SITTING UP IN CHAIR IN HALLWAY. PATIENT IS ALERT/ORIENTED X1 
WITH COMPLAINTS OF PAIN OR DISCOMFORT AT THIS TIME.  ALL SAFETY AND FALL PRECAUTION MEASURES 
ARE IN PLACE.  WILL CONTINUE TO MONITOR AND ASSESS.

## 2020-03-01 NOTE — NUR
Received patient in  a mable chair at the nurses station. Completely awake, despite only 
sleeping 45 min last night. Patient is confused and having visual hallucinations. Unable to 
have any meaningful conversation. Patient is medication compliant this am, but getting 
increasingly agitated and irritable. Plan to assist patient with ambulation and encourage 
use of the bathroom. Continuing to reorient to the environment and monitor for safety.

## 2020-03-02 VITALS — SYSTOLIC BLOOD PRESSURE: 145 MMHG | DIASTOLIC BLOOD PRESSURE: 68 MMHG

## 2020-03-02 VITALS — SYSTOLIC BLOOD PRESSURE: 144 MMHG | DIASTOLIC BLOOD PRESSURE: 91 MMHG

## 2020-03-02 VITALS — SYSTOLIC BLOOD PRESSURE: 108 MMHG | DIASTOLIC BLOOD PRESSURE: 82 MMHG

## 2020-03-02 RX ADMIN — CYANOCOBALAMIN TAB 1000 MCG SCH MCG: 1000 TAB at 09:00

## 2020-03-02 RX ADMIN — OLANZAPINE SCH MG: 2.5 TABLET ORAL at 13:00

## 2020-03-02 RX ADMIN — OLANZAPINE SCH MG: 2.5 TABLET ORAL at 17:00

## 2020-03-02 RX ADMIN — OXCARBAZEPINE SCH MG: 300 TABLET, FILM COATED ORAL at 17:00

## 2020-03-02 RX ADMIN — OXCARBAZEPINE SCH MG: 300 TABLET, FILM COATED ORAL at 13:00

## 2020-03-02 RX ADMIN — OLANZAPINE SCH MG: 2.5 TABLET ORAL at 22:05

## 2020-03-02 RX ADMIN — OXCARBAZEPINE SCH MG: 300 TABLET, FILM COATED ORAL at 09:00

## 2020-03-02 NOTE — NUR
Social Work Family Contact:



 received a call from patient's ex-wife, Lori ([phone]) and stated that 
her daughter, Ligia messaged her to give this  a call for updates on the 
patient status and discharge plans.  informed Lori that the patient will be 
discharged to a skilled nursing facility- [facility] ([phone]) before 
returning to his Assisted Living -[facility] ([phone]). Lori informed this that 
Ligia is very overwhelmed at the moment and she cannot stay "on top" of everything. This 
writer stated that we have been trying to get in touch with Ligia however we have the wrong 
phone number, The correct number given by Lori is ([phone]) and she stated she works 
from 10am-7pm.

## 2020-03-02 NOTE — NUR
PATIENT BANGING ON BED RAILS AND ATTEMPTING TO GET OUT OF BED. ATTEMPTS AT REDIRECTION ARE 
MET WITH YELLING AND CURSING.  PATIENT PLACED IN CHAIR AND IS IN HALLWAY FOR DIRECT 
OBSERVATION AND TO AVOID DISTURBING ROOMMATE.

## 2020-03-02 NOTE — NUR
RECEIVED PATIENT SITTING ON THE RECLINING CHAIR. PATIENT ALERT BUT CONFUSED, PATIENT WAS 
TALKING TO SELF. PATIENT DENIES PAIN AT THIS TIME. PATIENT COOPERATIVE WITH MEDICATIONS BUT 
YELLS AND SCREAMS WHEN GIVING NURSING CARE.

## 2020-03-02 NOTE — NUR
PATIENT WAS WHEELED TO THE ROOM TO GIVE NURSING CARE, CHANGED PATIENT WENT PAD, AND PUT NEW 
PAJAMAS.  PATIENT RESISTIVE WITH CARE, PATIENT BECOME COMBATIVE, REFUSED TO STAY IN BED, 
PATIENT DIAPER PADS WAS CHANGED, AND PLACE PATIENT BACK TO RECLINING CHAIR. PATIENT 
HALLUCINATING, CALL HIS SISTER NAME. PATIENT WAS TRIED TO REDICT BEHAVIOR BUT NOT EFFECTIVE. 
CONT TO MONITOR.

## 2020-03-02 NOTE — NUR
Pt received this morning resting in bed, assessed, no acute distress, no pain. Poor night 
sleep reported, determined best to let Pt sleep. Pt woke for lunch, refused all PO 
medications. Pt confused, aggressive, and combative towards staff. Pt assisted to bathroom 
x2 assist, returned to bed, undressed in bed, threw diaper, unable to reason with Pt. MD 
made aware, new orders received, IM medications administered with security present. No 
apparent intent to harm self, no SI. Will continue to monitor Pt for safety.

## 2020-03-02 NOTE — NUR
Social Work Individual Therapy Note:



 met with patient today to provide brief individual supportive counseling. 
Patient presents with disorganized thought process and is unable to engage in a meaningful 
conversation. SW attempted to encourage patient to engage in a conversation and participate 
in group. Patient was not very responsive and stared blankly at this writer and repeated, 
"what is this?".  will remain available to patient and continue to provide 
supportive counseling as possible and needed.

## 2020-03-03 VITALS — SYSTOLIC BLOOD PRESSURE: 123 MMHG | DIASTOLIC BLOOD PRESSURE: 73 MMHG

## 2020-03-03 VITALS — DIASTOLIC BLOOD PRESSURE: 89 MMHG | SYSTOLIC BLOOD PRESSURE: 124 MMHG

## 2020-03-03 VITALS — SYSTOLIC BLOOD PRESSURE: 134 MMHG | DIASTOLIC BLOOD PRESSURE: 85 MMHG

## 2020-03-03 LAB
ALP SERPL-CCNC: 126 U/L (ref 50–136)
ALT SERPL W/O P-5'-P-CCNC: 34 U/L (ref 16–63)
AST SERPL-CCNC: 29 U/L (ref 15–37)
BASOPHILS # BLD AUTO: 0.1 K/UL (ref 0–8)
BASOPHILS NFR BLD AUTO: 0.8 % (ref 0–2)
BILIRUB SERPL-MCNC: 0.5 MG/DL (ref 0.2–1)
BUN SERPL-MCNC: 21 MG/DL (ref 7–18)
CHLORIDE SERPL-SCNC: 106 MMOL/L (ref 98–107)
CO2 SERPL-SCNC: 27 MMOL/L (ref 21–32)
CREAT SERPL-MCNC: 1.1 MG/DL (ref 0.6–1.3)
EOSINOPHIL # BLD AUTO: 0.1 K/UL (ref 0–0.7)
EOSINOPHIL NFR BLD AUTO: 2 % (ref 0–7)
GLUCOSE SERPL-MCNC: 84 MG/DL (ref 74–106)
HCT VFR BLD AUTO: 44.4 % (ref 36.7–47.1)
HGB BLD-MCNC: 14.7 G/DL (ref 12.5–16.3)
LYMPHOCYTES # BLD AUTO: 1.6 K/UL (ref 20–40)
LYMPHOCYTES NFR BLD AUTO: 23.1 % (ref 20.5–51.5)
MAGNESIUM SERPL-MCNC: 2.1 MG/DL (ref 1.8–2.4)
MCH RBC QN AUTO: 27.7 UUG (ref 23.8–33.4)
MCHC RBC AUTO-ENTMCNC: 33 G/DL (ref 32.5–36.3)
MCV RBC AUTO: 83.5 FL (ref 73–96.2)
MONOCYTES # BLD AUTO: 0.5 K/UL (ref 2–10)
MONOCYTES NFR BLD AUTO: 7.6 % (ref 0–11)
NEUTROPHILS # BLD AUTO: 4.6 K/UL (ref 1.8–8.9)
NEUTROPHILS NFR BLD AUTO: 66.5 % (ref 38.5–71.5)
PLATELET # BLD AUTO: 186 K/UL (ref 152–348)
POTASSIUM SERPL-SCNC: 4.7 MMOL/L (ref 3.5–5.1)
RBC # BLD AUTO: 5.31 MIL/UL (ref 4.06–5.63)
WBC # BLD AUTO: 6.9 K/UL (ref 3.6–10.2)
WS STN SPEC: 7.9 G/DL (ref 6.4–8.2)

## 2020-03-03 RX ADMIN — OXCARBAZEPINE SCH MG: 300 TABLET, FILM COATED ORAL at 10:43

## 2020-03-03 RX ADMIN — ACETAMINOPHEN PRN MG: 325 TABLET ORAL at 21:22

## 2020-03-03 RX ADMIN — OLANZAPINE SCH MG: 2.5 TABLET ORAL at 17:24

## 2020-03-03 RX ADMIN — OLANZAPINE SCH MG: 2.5 TABLET ORAL at 10:34

## 2020-03-03 RX ADMIN — OXCARBAZEPINE SCH MG: 300 TABLET, FILM COATED ORAL at 10:34

## 2020-03-03 RX ADMIN — OXCARBAZEPINE SCH MG: 300 TABLET, FILM COATED ORAL at 17:24

## 2020-03-03 RX ADMIN — OLANZAPINE SCH MG: 2.5 TABLET ORAL at 10:43

## 2020-03-03 RX ADMIN — CYANOCOBALAMIN TAB 1000 MCG SCH MCG: 1000 TAB at 10:42

## 2020-03-03 RX ADMIN — CYANOCOBALAMIN TAB 1000 MCG SCH MCG: 1000 TAB at 10:34

## 2020-03-03 NOTE — NUR
Received patient in mable chair this am. Sleeping. Upon waking up patient begun getting loud 
and angry.Refused medications. Pushed nurses hand away when trying to assist patient with 
eating. Patient is very confused and having visual hallucination. Also paranoid and 
restless. Continuing to reorient patient to the environment. Doctor aware of behavior and 
orders received for lab work. Doctor made aware of the results. Patient continues to be 
restless , monitoring closely for safety. No acute distress  noted at this time

## 2020-03-04 VITALS — DIASTOLIC BLOOD PRESSURE: 88 MMHG | SYSTOLIC BLOOD PRESSURE: 118 MMHG

## 2020-03-04 VITALS — DIASTOLIC BLOOD PRESSURE: 93 MMHG | SYSTOLIC BLOOD PRESSURE: 156 MMHG

## 2020-03-04 VITALS — DIASTOLIC BLOOD PRESSURE: 80 MMHG | SYSTOLIC BLOOD PRESSURE: 141 MMHG

## 2020-03-04 RX ADMIN — OXCARBAZEPINE SCH MG: 300 TABLET, FILM COATED ORAL at 13:00

## 2020-03-04 RX ADMIN — TEMAZEPAM PRN MG: 7.5 CAPSULE ORAL at 00:11

## 2020-03-04 RX ADMIN — OLANZAPINE SCH MG: 2.5 TABLET ORAL at 13:00

## 2020-03-04 RX ADMIN — OXCARBAZEPINE SCH MG: 300 TABLET, FILM COATED ORAL at 08:45

## 2020-03-04 RX ADMIN — LORAZEPAM PRN MG: 1 TABLET ORAL at 22:08

## 2020-03-04 RX ADMIN — Medication SCH MG: at 17:23

## 2020-03-04 RX ADMIN — TEMAZEPAM PRN MG: 7.5 CAPSULE ORAL at 01:36

## 2020-03-04 RX ADMIN — CYANOCOBALAMIN TAB 1000 MCG SCH MCG: 1000 TAB at 08:47

## 2020-03-04 RX ADMIN — LORAZEPAM PRN MG: 1 TABLET ORAL at 23:32

## 2020-03-04 RX ADMIN — HALOPERIDOL SCH MG: 2 SOLUTION ORAL at 17:23

## 2020-03-04 RX ADMIN — HALOPERIDOL SCH MG: 2 SOLUTION ORAL at 17:00

## 2020-03-04 RX ADMIN — OXCARBAZEPINE SCH MG: 300 TABLET, FILM COATED ORAL at 17:23

## 2020-03-04 RX ADMIN — OLANZAPINE SCH MG: 2.5 TABLET ORAL at 08:45

## 2020-03-04 NOTE — NUR
Patient slept a few hours this morning but is now awake, angry, combative and confused. 
Unable to reorient to environment despite multiple attempts. Patient yelling at staff 
telling the nurse " shut the fuck up" and taking cloths off. Trying to climb out of mable 
chair. Patent put close to nurses station for safety. Continuing to monitor for safety. Also 
refused afternoon medication. MD aware.

## 2020-03-04 NOTE — NUR
PATIENT FIRST DOSE OF RESTORIL WAS NOT EFFECTIVE, OFFER THE REPEAT X1 RESTORIL BUT REFUSED, 
PATIENT AGITATED.

## 2020-03-04 NOTE — NUR
PATIENT ALERT BUT WITH CONFUSION DUE TO HEALTH CONDITION. PATIENT WAS AWAKE ALL NIGHT, 
PATIENT REFUSED TO STAY IN BED, CLIMBS OUT OF BED AND GETS AGITATED WHEN TRIED TO REDIRECT 
BEHAVIOR. PATIENT WAS KEPT AND CLEAN, CONT TO MONITOR.

## 2020-03-05 VITALS — DIASTOLIC BLOOD PRESSURE: 87 MMHG | SYSTOLIC BLOOD PRESSURE: 141 MMHG

## 2020-03-05 VITALS — SYSTOLIC BLOOD PRESSURE: 144 MMHG | DIASTOLIC BLOOD PRESSURE: 94 MMHG

## 2020-03-05 VITALS — DIASTOLIC BLOOD PRESSURE: 73 MMHG | SYSTOLIC BLOOD PRESSURE: 147 MMHG

## 2020-03-05 RX ADMIN — TEMAZEPAM PRN MG: 15 CAPSULE ORAL at 01:59

## 2020-03-05 RX ADMIN — TEMAZEPAM SCH MG: 15 CAPSULE ORAL at 20:00

## 2020-03-05 RX ADMIN — HALOPERIDOL SCH MG: 5 TABLET ORAL at 20:00

## 2020-03-05 RX ADMIN — OXCARBAZEPINE SCH MG: 150 TABLET, FILM COATED ORAL at 17:00

## 2020-03-05 RX ADMIN — Medication SCH MG: at 17:00

## 2020-03-05 RX ADMIN — HALOPERIDOL SCH MG: 2 SOLUTION ORAL at 17:00

## 2020-03-05 RX ADMIN — HALOPERIDOL SCH MG: 2 SOLUTION ORAL at 09:36

## 2020-03-05 RX ADMIN — TEMAZEPAM PRN MG: 15 CAPSULE ORAL at 01:42

## 2020-03-05 RX ADMIN — OXCARBAZEPINE SCH MG: 300 TABLET, FILM COATED ORAL at 09:36

## 2020-03-05 RX ADMIN — Medication SCH MG: at 09:37

## 2020-03-05 RX ADMIN — CYANOCOBALAMIN TAB 1000 MCG SCH MCG: 1000 TAB at 09:37

## 2020-03-05 RX ADMIN — HALOPERIDOL SCH MG: 5 TABLET ORAL at 21:45

## 2020-03-05 RX ADMIN — TEMAZEPAM SCH MG: 15 CAPSULE ORAL at 21:46

## 2020-03-05 NOTE — NUR
Gps/Lvn- Taken to bathroom to void, no results. Offered fluid, pudding,juice , refused., 
irritable, yells at the staff providing his care. Noted occ. dry coughing this pm , w/c 
subsided . Dr Cates called this pm, to check on patient status , informed patient refusing 
to eat , gets irritable when staff tries to assist him with pm care.Infused refused, pm 
meds.

## 2020-03-05 NOTE — NUR
GPS:   Pt.took Restoril 15mg PO at this time for insomnia after another attempt by staff. 
Will monitor effectiveness.

## 2020-03-05 NOTE — NUR
GPS:   Pt.remains awake at this time. Restless,confused,disoriented and disorganized. 
Frequent re-direction provided by staff prn. Refused Restoril 15 mg PO for insomnia despite 
numerous attempts by staff. Ativan 1mg PO was given few hrs. ago for increased 
anxiety/agitation with minimal effect. Quiet environment provided to facilitate sleep. Fall 
precautions observed.

## 2020-03-05 NOTE — NUR
Gps/Lvn- Patient sleepy, arousable, yells  and gets agitated/angry when awakened to eat and 
take fluids.meds. re -offered, refused,

## 2020-03-06 VITALS — DIASTOLIC BLOOD PRESSURE: 66 MMHG | SYSTOLIC BLOOD PRESSURE: 128 MMHG

## 2020-03-06 RX ADMIN — TEMAZEPAM SCH MG: 15 CAPSULE ORAL at 20:00

## 2020-03-06 RX ADMIN — Medication SCH MG: at 16:14

## 2020-03-06 RX ADMIN — Medication SCH MG: at 10:03

## 2020-03-06 RX ADMIN — OXCARBAZEPINE SCH MG: 150 TABLET, FILM COATED ORAL at 16:14

## 2020-03-06 RX ADMIN — HALOPERIDOL SCH MG: 2 SOLUTION ORAL at 20:00

## 2020-03-06 RX ADMIN — CYANOCOBALAMIN TAB 1000 MCG SCH MCG: 1000 TAB at 10:04

## 2020-03-06 RX ADMIN — LORAZEPAM PRN MG: 1 TABLET ORAL at 23:49

## 2020-03-06 RX ADMIN — HALOPERIDOL SCH MG: 2 SOLUTION ORAL at 10:03

## 2020-03-06 RX ADMIN — OXCARBAZEPINE SCH MG: 150 TABLET, FILM COATED ORAL at 10:03

## 2020-03-06 RX ADMIN — HALOPERIDOL SCH MG: 2 SOLUTION ORAL at 21:50

## 2020-03-06 RX ADMIN — HALOPERIDOL SCH MG: 2 SOLUTION ORAL at 16:14

## 2020-03-06 NOTE — NUR
Gps/Lvn-Sliding from his mable-chair, agitated, redirected,, monitored safety, confused, poor 
safety judgement, monitored needs. Adequate fluid intake

## 2020-03-07 VITALS — SYSTOLIC BLOOD PRESSURE: 167 MMHG | DIASTOLIC BLOOD PRESSURE: 80 MMHG

## 2020-03-07 VITALS — SYSTOLIC BLOOD PRESSURE: 139 MMHG | DIASTOLIC BLOOD PRESSURE: 82 MMHG

## 2020-03-07 VITALS — DIASTOLIC BLOOD PRESSURE: 71 MMHG | SYSTOLIC BLOOD PRESSURE: 143 MMHG

## 2020-03-07 RX ADMIN — CYANOCOBALAMIN TAB 1000 MCG SCH MCG: 1000 TAB at 09:00

## 2020-03-07 RX ADMIN — OXCARBAZEPINE SCH MG: 150 TABLET, FILM COATED ORAL at 09:00

## 2020-03-07 RX ADMIN — HALOPERIDOL SCH MG: 2 SOLUTION ORAL at 09:00

## 2020-03-07 RX ADMIN — LORAZEPAM PRN MG: 1 TABLET ORAL at 22:13

## 2020-03-07 RX ADMIN — HALOPERIDOL SCH MG: 2 SOLUTION ORAL at 16:51

## 2020-03-07 RX ADMIN — HALOPERIDOL SCH MG: 2 SOLUTION ORAL at 11:36

## 2020-03-07 RX ADMIN — HALOPERIDOL SCH MG: 2 SOLUTION ORAL at 20:14

## 2020-03-07 RX ADMIN — Medication SCH MG: at 17:00

## 2020-03-07 RX ADMIN — TEMAZEPAM SCH MG: 15 CAPSULE ORAL at 20:15

## 2020-03-07 RX ADMIN — ANORECTAL OINTMENT PRN GM: 15.7; .44; 24; 20.6 OINTMENT TOPICAL at 06:45

## 2020-03-07 RX ADMIN — Medication SCH MG: at 09:00

## 2020-03-07 NOTE — NUR
GPS: Nursing Notes: Thought Disorder:

Patient awake and responding to his name, uncooperative with nursing care, resistant with 
nursing care, loud and pressured speech, poor appetite, refusing to eat, refusing 
medications this am, poor anger management, gets easily irritable when redirected, 
forgetful, confused, disoriented, poor insight, unable to formulate a viable plan for self 
care, paranoid behavior, internally preoccupied, episodes of talking incoherently, episodes 
of trying to strike out when assisting him with ADL's, continue with treatment plan.

## 2020-03-07 NOTE — NUR
Pt given ativan PO PRN, still awake/ agitated despite of temazepam and haldol given. Pt took 
medications with orange juice.

## 2020-03-08 VITALS — DIASTOLIC BLOOD PRESSURE: 87 MMHG | SYSTOLIC BLOOD PRESSURE: 137 MMHG

## 2020-03-08 VITALS — DIASTOLIC BLOOD PRESSURE: 83 MMHG | SYSTOLIC BLOOD PRESSURE: 132 MMHG

## 2020-03-08 VITALS — SYSTOLIC BLOOD PRESSURE: 136 MMHG | DIASTOLIC BLOOD PRESSURE: 78 MMHG

## 2020-03-08 RX ADMIN — Medication SCH MG: at 08:37

## 2020-03-08 RX ADMIN — HALOPERIDOL SCH MG: 2 SOLUTION ORAL at 16:36

## 2020-03-08 RX ADMIN — HALOPERIDOL SCH MG: 2 SOLUTION ORAL at 08:37

## 2020-03-08 RX ADMIN — QUETIAPINE SCH MG: 25 TABLET, FILM COATED ORAL at 20:09

## 2020-03-08 RX ADMIN — CYANOCOBALAMIN TAB 1000 MCG SCH MCG: 1000 TAB at 08:37

## 2020-03-08 RX ADMIN — Medication SCH MG: at 16:36

## 2020-03-08 RX ADMIN — ANORECTAL OINTMENT PRN GM: 15.7; .44; 24; 20.6 OINTMENT TOPICAL at 23:39

## 2020-03-08 RX ADMIN — QUETIAPINE PRN MG: 25 TABLET, FILM COATED ORAL at 20:18

## 2020-03-08 RX ADMIN — QUETIAPINE SCH MG: 25 TABLET, FILM COATED ORAL at 21:02

## 2020-03-08 NOTE — NUR
GPS: Nursing Notes: Thought Disorder:

Patient awake and responding to his name, confused, disoriented, impaired judgment, talking 
incoherently, gets easily irritable when redirected, poor anger management, verbal abusive 
toward staff, loud and angry affect, stated "You are trash... Asshole..", resistant with 
nursing care, disrobing in the hallway, gets easily irritable when trying to put a T-shirt 
on him because he is half naked on the hallway, trying to strike out at staff, unable to 
formulate a viable plan for self care, needs a lot prompting to be compliant with his 
medications, continue with treatment plan.

## 2020-03-08 NOTE — NUR
GPS:  Pt.took meds.earlier without any resistance from staff. Calm,cooperative without any 
agitation noted. Remains confused,disoriented and disorganized. Fluids and snacks were given 
and katarina.well. Fall precautions observed. Re-directed prn.

## 2020-03-08 NOTE — NUR
Pt awake all. Was talking to self most of the night.  Pt had haldol, restoril and ativan 
during shift.

-------------------------------------------------------------------------------

Addendum: 03/08/20 at 0610 by MAVERICK SULLIVAN RN

-------------------------------------------------------------------------------

Awake all night.

## 2020-03-09 VITALS — DIASTOLIC BLOOD PRESSURE: 83 MMHG | SYSTOLIC BLOOD PRESSURE: 133 MMHG

## 2020-03-09 VITALS — SYSTOLIC BLOOD PRESSURE: 119 MMHG | DIASTOLIC BLOOD PRESSURE: 91 MMHG

## 2020-03-09 VITALS — DIASTOLIC BLOOD PRESSURE: 90 MMHG | SYSTOLIC BLOOD PRESSURE: 125 MMHG

## 2020-03-09 LAB
ALP SERPL-CCNC: 121 U/L (ref 50–136)
ALT SERPL W/O P-5'-P-CCNC: 42 U/L (ref 16–63)
AST SERPL-CCNC: 22 U/L (ref 15–37)
BASOPHILS # BLD AUTO: 0.1 K/UL (ref 0–8)
BASOPHILS NFR BLD AUTO: 0.7 % (ref 0–2)
BILIRUB SERPL-MCNC: 0.7 MG/DL (ref 0.2–1)
BUN SERPL-MCNC: 31 MG/DL (ref 7–18)
CHLORIDE SERPL-SCNC: 104 MMOL/L (ref 98–107)
CO2 SERPL-SCNC: 32 MMOL/L (ref 21–32)
CREAT SERPL-MCNC: 1.3 MG/DL (ref 0.6–1.3)
EOSINOPHIL # BLD AUTO: 0.1 K/UL (ref 0–0.7)
EOSINOPHIL NFR BLD AUTO: 1.4 % (ref 0–7)
GLUCOSE SERPL-MCNC: 108 MG/DL (ref 74–106)
HCT VFR BLD AUTO: 44.8 % (ref 36.7–47.1)
HGB BLD-MCNC: 15 G/DL (ref 12.5–16.3)
LYMPHOCYTES # BLD AUTO: 1.7 K/UL (ref 20–40)
LYMPHOCYTES NFR BLD AUTO: 18 % (ref 20.5–51.5)
MCH RBC QN AUTO: 28 UUG (ref 23.8–33.4)
MCHC RBC AUTO-ENTMCNC: 34 G/DL (ref 32.5–36.3)
MCV RBC AUTO: 83.8 FL (ref 73–96.2)
MONOCYTES # BLD AUTO: 0.7 K/UL (ref 2–10)
MONOCYTES NFR BLD AUTO: 7.6 % (ref 0–11)
NEUTROPHILS # BLD AUTO: 6.6 K/UL (ref 1.8–8.9)
NEUTROPHILS NFR BLD AUTO: 72.3 % (ref 38.5–71.5)
PLATELET # BLD AUTO: 191 K/UL (ref 152–348)
POTASSIUM SERPL-SCNC: 3.6 MMOL/L (ref 3.5–5.1)
RBC # BLD AUTO: 5.35 MIL/UL (ref 4.06–5.63)
WBC # BLD AUTO: 9.2 K/UL (ref 3.6–10.2)
WS STN SPEC: 7.9 G/DL (ref 6.4–8.2)

## 2020-03-09 RX ADMIN — QUETIAPINE SCH MG: 25 TABLET, FILM COATED ORAL at 20:03

## 2020-03-09 RX ADMIN — HALOPERIDOL SCH MG: 2 SOLUTION ORAL at 16:05

## 2020-03-09 RX ADMIN — LORAZEPAM PRN MG: 1 TABLET ORAL at 21:36

## 2020-03-09 RX ADMIN — QUETIAPINE PRN MG: 25 TABLET, FILM COATED ORAL at 23:14

## 2020-03-09 RX ADMIN — QUETIAPINE SCH MG: 25 TABLET, FILM COATED ORAL at 13:36

## 2020-03-09 RX ADMIN — Medication SCH MG: at 08:51

## 2020-03-09 RX ADMIN — QUETIAPINE SCH MG: 25 TABLET, FILM COATED ORAL at 16:04

## 2020-03-09 RX ADMIN — ANORECTAL OINTMENT PRN GM: 15.7; .44; 24; 20.6 OINTMENT TOPICAL at 20:55

## 2020-03-09 RX ADMIN — Medication SCH MG: at 16:04

## 2020-03-09 RX ADMIN — CYANOCOBALAMIN TAB 1000 MCG SCH MCG: 1000 TAB at 08:51

## 2020-03-09 RX ADMIN — HALOPERIDOL SCH MG: 2 SOLUTION ORAL at 13:36

## 2020-03-09 RX ADMIN — ANORECTAL OINTMENT PRN GM: 15.7; .44; 24; 20.6 OINTMENT TOPICAL at 05:58

## 2020-03-09 NOTE — NUR
Social Work Brief Substance Abuse Intervention:



Patient was provided with a brief substance abuse intervention and provided with the [facility] Substance Abuse Self-helpline (SAS) ([phone]), [facility] [address] [place] [address] ([phone]), [address] [place] [address] ([phone]).

## 2020-03-09 NOTE — NUR
GPS:   Slept 5 1/2 hrs.last night. Now awake and attempting to get out of bed. At high risk 
for falls due to poor safety awareness. Re-directed frequently. Bed alarm on for safety. 
Slightly combative and resistant during incontinence care. Poor insight to present 
situation. Will continue to monitor.

## 2020-03-10 VITALS — SYSTOLIC BLOOD PRESSURE: 129 MMHG | DIASTOLIC BLOOD PRESSURE: 97 MMHG

## 2020-03-10 VITALS — DIASTOLIC BLOOD PRESSURE: 68 MMHG | SYSTOLIC BLOOD PRESSURE: 119 MMHG

## 2020-03-10 RX ADMIN — Medication SCH MG: at 17:06

## 2020-03-10 RX ADMIN — CYANOCOBALAMIN TAB 1000 MCG SCH MCG: 1000 TAB at 09:33

## 2020-03-10 RX ADMIN — QUETIAPINE SCH MG: 25 TABLET, FILM COATED ORAL at 13:11

## 2020-03-10 RX ADMIN — Medication SCH MG: at 09:33

## 2020-03-10 RX ADMIN — QUETIAPINE SCH MG: 25 TABLET, FILM COATED ORAL at 20:56

## 2020-03-10 RX ADMIN — QUETIAPINE SCH MG: 25 TABLET, FILM COATED ORAL at 20:29

## 2020-03-10 RX ADMIN — HALOPERIDOL SCH MG: 2 SOLUTION ORAL at 09:33

## 2020-03-10 RX ADMIN — QUETIAPINE SCH MG: 25 TABLET, FILM COATED ORAL at 09:33

## 2020-03-10 RX ADMIN — QUETIAPINE SCH MG: 25 TABLET, FILM COATED ORAL at 17:06

## 2020-03-10 NOTE — NUR
GPS:  Pt.slept 6 hours last night. Incontinence care just rendered. Less resistant to care. 
Fall precautions observed. Will continue to monitor.

## 2020-03-11 VITALS — DIASTOLIC BLOOD PRESSURE: 74 MMHG | SYSTOLIC BLOOD PRESSURE: 125 MMHG

## 2020-03-11 RX ADMIN — ANORECTAL OINTMENT PRN GM: 15.7; .44; 24; 20.6 OINTMENT TOPICAL at 06:30

## 2020-03-11 RX ADMIN — Medication SCH MG: at 08:26

## 2020-03-11 RX ADMIN — QUETIAPINE SCH MG: 25 TABLET, FILM COATED ORAL at 13:08

## 2020-03-11 RX ADMIN — QUETIAPINE SCH MG: 25 TABLET, FILM COATED ORAL at 08:26

## 2020-03-11 RX ADMIN — LORAZEPAM PRN MG: 1 TABLET ORAL at 01:30

## 2020-03-11 RX ADMIN — CYANOCOBALAMIN TAB 1000 MCG SCH MCG: 1000 TAB at 08:26

## 2020-03-11 NOTE — NUR
GPS:   Pt.noted to be awake now and attempting to get out of bed. Frequent re-direction 
provided. Has poor safety awareness. Anxious and refusing meds when offered and refusing to 
open his mouth despite numerous attempts. Refusing liquids also at this time. Will continue 
to monitor.

## 2020-03-11 NOTE — NUR
GPS:   Pt.slept for only 3 1/2 hrs.last night. Noted to be talking to self most of the night 
with intermittent episodes of trying to climb out of bed. Re-directed prn. Less 
combative/resistant during incontinence care earlier. Will continue to monitor.

## 2020-03-11 NOTE — NUR
Social Work Discharge Note:



Patient will be discharged back to skilled nursing [facility] [address] 
[place] [address] ([phone]) via ambulance. Patient will be 
transported by ambulance at 12pm. Spoke with Jamar  at the facility 
who states they are ready to accept the patient today. Patient will follow-up at the 
facility with Dr. Abbasi Internist and Dr. Cates Psychiatrist. Patient is alert and 
oriented times 2, denies suicidal or homicidal ideation, and is aware and agreeable with 
discharge plans. Patient presents with normal mood and congruent affect. Patient is unable 
to plan for self-care at this time, however, is willing to accept care provided at the 
facility. Patients daughter, Ligia Smith ([phone]) is made aware and is agreeable 
with discharge plans. Patient was provided with a brief substance abuse intervention and 
provided with the [facility] Substance Abuse Self-helpline ([facility]) ([phone]), 
[facility] [address] [place] [address] ([phone]), [facility] [address] [facility] [place] [address] ([phone]).

## 2020-03-11 NOTE — NUR
Patient is being discharged to [facility]. VS are stable, mental status is at 
baseline. Pt is A/O x 1. No agitation, no distress. Report called to JESSICA Garcia. Pt's 
daughter Ligia Smith is aware of discharge. All belongings returned

## 2020-04-30 ENCOUNTER — HOSPITAL ENCOUNTER (EMERGENCY)
Dept: HOSPITAL 54 - ER | Age: 75
Discharge: NURSING, REHAB OR LONG-TERM CARE | End: 2020-04-30
Payer: MEDICARE

## 2020-04-30 VITALS — SYSTOLIC BLOOD PRESSURE: 128 MMHG | DIASTOLIC BLOOD PRESSURE: 71 MMHG

## 2020-04-30 VITALS — HEIGHT: 69 IN | WEIGHT: 124 LBS | BODY MASS INDEX: 18.37 KG/M2

## 2020-04-30 DIAGNOSIS — F03.90: ICD-10-CM

## 2020-04-30 DIAGNOSIS — Z79.899: ICD-10-CM

## 2020-04-30 DIAGNOSIS — K40.90: Primary | ICD-10-CM

## 2020-04-30 DIAGNOSIS — E11.9: ICD-10-CM

## 2020-04-30 LAB
BASOPHILS # BLD AUTO: 0.1 /CMM (ref 0–0.2)
BASOPHILS NFR BLD AUTO: 0.7 % (ref 0–2)
BUN SERPL-MCNC: 16 MG/DL (ref 7–18)
CALCIUM SERPL-MCNC: 9.3 MG/DL (ref 8.5–10.1)
CHLORIDE SERPL-SCNC: 103 MMOL/L (ref 98–107)
CO2 SERPL-SCNC: 32 MMOL/L (ref 21–32)
CREAT SERPL-MCNC: 1.4 MG/DL (ref 0.6–1.3)
EOSINOPHIL NFR BLD AUTO: 2.6 % (ref 0–6)
GLUCOSE SERPL-MCNC: 102 MG/DL (ref 74–106)
HCT VFR BLD AUTO: 41 % (ref 39–51)
HGB BLD-MCNC: 13.7 G/DL (ref 13.5–17.5)
LYMPHOCYTES NFR BLD AUTO: 1.8 /CMM (ref 0.8–4.8)
LYMPHOCYTES NFR BLD AUTO: 19.2 % (ref 20–44)
MCHC RBC AUTO-ENTMCNC: 33 G/DL (ref 31–36)
MCV RBC AUTO: 83 FL (ref 80–96)
MONOCYTES NFR BLD AUTO: 0.6 /CMM (ref 0.1–1.3)
MONOCYTES NFR BLD AUTO: 6 % (ref 2–12)
NEUTROPHILS # BLD AUTO: 6.8 /CMM (ref 1.8–8.9)
NEUTROPHILS NFR BLD AUTO: 71.5 % (ref 43–81)
PLATELET # BLD AUTO: 282 /CMM (ref 150–450)
POTASSIUM SERPL-SCNC: 4.5 MMOL/L (ref 3.5–5.1)
RBC # BLD AUTO: 4.95 MIL/UL (ref 4.5–6)
SODIUM SERPL-SCNC: 140 MMOL/L (ref 136–145)
WBC NRBC COR # BLD AUTO: 9.6 K/UL (ref 4.3–11)

## 2020-04-30 PROCEDURE — 76870 US EXAM SCROTUM: CPT

## 2020-04-30 PROCEDURE — 96374 THER/PROPH/DIAG INJ IV PUSH: CPT

## 2020-04-30 PROCEDURE — 83605 ASSAY OF LACTIC ACID: CPT

## 2020-04-30 PROCEDURE — 85025 COMPLETE CBC W/AUTO DIFF WBC: CPT

## 2020-04-30 PROCEDURE — 74177 CT ABD & PELVIS W/CONTRAST: CPT

## 2020-04-30 PROCEDURE — 99285 EMERGENCY DEPT VISIT HI MDM: CPT

## 2020-04-30 PROCEDURE — 36415 COLL VENOUS BLD VENIPUNCTURE: CPT

## 2020-04-30 PROCEDURE — 80048 BASIC METABOLIC PNL TOTAL CA: CPT

## 2020-04-30 NOTE — NUR
REPORT GIVEN TO MANUELA EMT 

IV removed. Catheter intact and site benign. Pressure and 4x4 applied to site. 
No bleeding noted.

Patient discharged to care center in stable condition. Written and verbal after 
care instructions given. Patient verbalizes understanding of instruction.

## 2020-04-30 NOTE — NUR
PT BIB [facility]. PER REPORT, INGUINAL HERNIA R/O STRANGULATION, PT IS AAOX3, 
NOT IN RESPIRATORY DISTRESS, HOOKED TO MONITOR, KEPT RESTED AND COMFORTABLE, 
WILL CONTINUE TO MONITOR.

## 2020-06-03 ENCOUNTER — HOSPITAL ENCOUNTER (INPATIENT)
Dept: HOSPITAL 54 - ER | Age: 75
LOS: 2 days | Discharge: NURSING, REHAB OR LONG-TERM CARE | DRG: 689 | End: 2020-06-05
Attending: INTERNAL MEDICINE | Admitting: NURSE PRACTITIONER
Payer: MEDICARE

## 2020-06-03 VITALS — WEIGHT: 105 LBS | HEIGHT: 71 IN | BODY MASS INDEX: 14.7 KG/M2

## 2020-06-03 VITALS — DIASTOLIC BLOOD PRESSURE: 86 MMHG | SYSTOLIC BLOOD PRESSURE: 149 MMHG

## 2020-06-03 VITALS — DIASTOLIC BLOOD PRESSURE: 86 MMHG | SYSTOLIC BLOOD PRESSURE: 147 MMHG

## 2020-06-03 DIAGNOSIS — E43: ICD-10-CM

## 2020-06-03 DIAGNOSIS — I25.10: ICD-10-CM

## 2020-06-03 DIAGNOSIS — E86.0: ICD-10-CM

## 2020-06-03 DIAGNOSIS — F03.90: ICD-10-CM

## 2020-06-03 DIAGNOSIS — I12.9: ICD-10-CM

## 2020-06-03 DIAGNOSIS — E11.9: ICD-10-CM

## 2020-06-03 DIAGNOSIS — F32.9: ICD-10-CM

## 2020-06-03 DIAGNOSIS — N39.0: Primary | ICD-10-CM

## 2020-06-03 DIAGNOSIS — G93.41: ICD-10-CM

## 2020-06-03 DIAGNOSIS — D68.69: ICD-10-CM

## 2020-06-03 DIAGNOSIS — J44.9: ICD-10-CM

## 2020-06-03 DIAGNOSIS — F41.9: ICD-10-CM

## 2020-06-03 DIAGNOSIS — N18.9: ICD-10-CM

## 2020-06-03 DIAGNOSIS — R64: ICD-10-CM

## 2020-06-03 DIAGNOSIS — N17.0: ICD-10-CM

## 2020-06-03 LAB
ALBUMIN SERPL BCP-MCNC: 3.6 G/DL (ref 3.4–5)
ALP SERPL-CCNC: 74 U/L (ref 46–116)
ALT SERPL W P-5'-P-CCNC: 17 U/L (ref 12–78)
AST SERPL W P-5'-P-CCNC: 17 U/L (ref 15–37)
BASOPHILS # BLD AUTO: 0.1 /CMM (ref 0–0.2)
BASOPHILS NFR BLD AUTO: 0.5 % (ref 0–2)
BILIRUB DIRECT SERPL-MCNC: 0.3 MG/DL (ref 0–0.2)
BILIRUB SERPL-MCNC: 1 MG/DL (ref 0.2–1)
BILIRUB UR QL STRIP: (no result)
BUN SERPL-MCNC: 26 MG/DL (ref 7–18)
CALCIUM SERPL-MCNC: 9.5 MG/DL (ref 8.5–10.1)
CHLORIDE SERPL-SCNC: 102 MMOL/L (ref 98–107)
CO2 SERPL-SCNC: 28 MMOL/L (ref 21–32)
CREAT SERPL-MCNC: 1.2 MG/DL (ref 0.6–1.3)
EOSINOPHIL NFR BLD AUTO: 0.4 % (ref 0–6)
GLUCOSE SERPL-MCNC: 94 MG/DL (ref 74–106)
HCT VFR BLD AUTO: 41 % (ref 39–51)
HGB BLD-MCNC: 13.7 G/DL (ref 13.5–17.5)
KETONES UR STRIP-MCNC: 40 MG/DL
LYMPHOCYTES NFR BLD AUTO: 1.9 /CMM (ref 0.8–4.8)
LYMPHOCYTES NFR BLD AUTO: 16.6 % (ref 20–44)
MCHC RBC AUTO-ENTMCNC: 33 G/DL (ref 31–36)
MCV RBC AUTO: 82 FL (ref 80–96)
MONOCYTES NFR BLD AUTO: 0.5 /CMM (ref 0.1–1.3)
MONOCYTES NFR BLD AUTO: 4.1 % (ref 2–12)
NEUTROPHILS # BLD AUTO: 8.8 /CMM (ref 1.8–8.9)
NEUTROPHILS NFR BLD AUTO: 78.4 % (ref 43–81)
PH UR STRIP: 5.5 [PH] (ref 5–8)
PLATELET # BLD AUTO: 341 /CMM (ref 150–450)
POTASSIUM SERPL-SCNC: 3.8 MMOL/L (ref 3.5–5.1)
PROT SERPL-MCNC: 7.6 G/DL (ref 6.4–8.2)
PROT UR QL STRIP: 100 MG/DL
RBC # BLD AUTO: 5.06 MIL/UL (ref 4.5–6)
RBC #/AREA URNS HPF: (no result) /HPF (ref 0–2)
SODIUM SERPL-SCNC: 140 MMOL/L (ref 136–145)
UROBILINOGEN UR STRIP-MCNC: 1 EU/DL
WBC #/AREA URNS HPF: (no result) /HPF (ref 0–3)
WBC NRBC COR # BLD AUTO: 11.2 K/UL (ref 4.3–11)

## 2020-06-03 PROCEDURE — G0378 HOSPITAL OBSERVATION PER HR: HCPCS

## 2020-06-03 RX ADMIN — SODIUM CHLORIDE PRN MLS/HR: 9 INJECTION, SOLUTION INTRAVENOUS at 23:34

## 2020-06-03 NOTE — NUR
TASIA FROM SNF TO ER BED 12. AAO1. NOT IN RESP DISTRESS. BROUGHT IN ON GURNEY. 
BROUGHT IN FOR POOR ORAL INTAKE IN THE PAST FOR DAYS. PER REPORT, HE STILL 
TAKES HIS MEDS WITH APPLE SAUCE WHICH WAS THE ONLY FOOD EATEN FOR THE PAST 4 
DAYS. PT PRESENTED WITH F/C. AWAITING MD FOR EVAL.

## 2020-06-03 NOTE — NUR
RN TELE ADMISSION OPENING NOTES

 [place] A[place] ALERT AND ORIENTED X1, CONFUSED , TO NAME ONLY. 
RESPIRATIONS EVEN AND UNLABORED WITH EQUAL RISE AND FALL OF CHEST, NO S/S OF PAIN OR 
DISCOMFORT PRESENT, ON CARDIAC TELE MONITOR SR 64, LUCAS CATHETER INTACT AND PATENT DRAINING 
WELL NOTED URINE HARESH BROWN COLOR WITH SEDIMENTS. LEFT WRIST #20G IV SITE INTACT AND 
PATENT, NO REDNESS, NO INFILTRATION PRESENT, BODY ASSESSMENT DONE, NO BELONGINGS PRESENT, 
ORIENTED TO STAFF AND CALL LIGHT AND KEPT WITHIN REACH, ALL NEEDS ATTENDED AT THIS TIME, 
WILL AWAIT MD ORDERS.

## 2020-06-04 VITALS — SYSTOLIC BLOOD PRESSURE: 156 MMHG | DIASTOLIC BLOOD PRESSURE: 90 MMHG

## 2020-06-04 VITALS — DIASTOLIC BLOOD PRESSURE: 55 MMHG | SYSTOLIC BLOOD PRESSURE: 103 MMHG

## 2020-06-04 VITALS — SYSTOLIC BLOOD PRESSURE: 136 MMHG | DIASTOLIC BLOOD PRESSURE: 73 MMHG

## 2020-06-04 VITALS — SYSTOLIC BLOOD PRESSURE: 143 MMHG | DIASTOLIC BLOOD PRESSURE: 75 MMHG

## 2020-06-04 VITALS — DIASTOLIC BLOOD PRESSURE: 74 MMHG | SYSTOLIC BLOOD PRESSURE: 133 MMHG

## 2020-06-04 LAB
BASOPHILS # BLD AUTO: 0.1 /CMM (ref 0–0.2)
BASOPHILS NFR BLD AUTO: 0.6 % (ref 0–2)
BUN SERPL-MCNC: 22 MG/DL (ref 7–18)
CALCIUM SERPL-MCNC: 9 MG/DL (ref 8.5–10.1)
CHLORIDE SERPL-SCNC: 101 MMOL/L (ref 98–107)
CHOLEST SERPL-MCNC: 137 MG/DL (ref ?–200)
CO2 SERPL-SCNC: 27 MMOL/L (ref 21–32)
CREAT SERPL-MCNC: 1.2 MG/DL (ref 0.6–1.3)
EOSINOPHIL NFR BLD AUTO: 0.4 % (ref 0–6)
GLUCOSE SERPL-MCNC: 90 MG/DL (ref 74–106)
HCT VFR BLD AUTO: 41 % (ref 39–51)
HDLC SERPL-MCNC: 57 MG/DL (ref 40–60)
HGB BLD-MCNC: 13.7 G/DL (ref 13.5–17.5)
LDLC SERPL DIRECT ASSAY-MCNC: 66 MG/DL (ref 0–99)
LYMPHOCYTES NFR BLD AUTO: 1.4 /CMM (ref 0.8–4.8)
LYMPHOCYTES NFR BLD AUTO: 14.8 % (ref 20–44)
MAGNESIUM SERPL-MCNC: 2.2 MG/DL (ref 1.8–2.4)
MCHC RBC AUTO-ENTMCNC: 33 G/DL (ref 31–36)
MCV RBC AUTO: 81 FL (ref 80–96)
MONOCYTES NFR BLD AUTO: 0.3 /CMM (ref 0.1–1.3)
MONOCYTES NFR BLD AUTO: 3.2 % (ref 2–12)
NEUTROPHILS # BLD AUTO: 7.7 /CMM (ref 1.8–8.9)
NEUTROPHILS NFR BLD AUTO: 81 % (ref 43–81)
PHOSPHATE SERPL-MCNC: 2.6 MG/DL (ref 2.5–4.9)
PLATELET # BLD AUTO: 312 /CMM (ref 150–450)
POTASSIUM SERPL-SCNC: 3.8 MMOL/L (ref 3.5–5.1)
RBC # BLD AUTO: 5.09 MIL/UL (ref 4.5–6)
SODIUM SERPL-SCNC: 139 MMOL/L (ref 136–145)
TRIGL SERPL-MCNC: 67 MG/DL (ref 30–150)
TSH SERPL DL<=0.005 MIU/L-ACNC: 2.64 UIU/ML (ref 0.36–3.74)
WBC NRBC COR # BLD AUTO: 9.5 K/UL (ref 4.3–11)

## 2020-06-04 RX ADMIN — SODIUM CHLORIDE PRN MLS/HR: 9 INJECTION, SOLUTION INTRAVENOUS at 15:37

## 2020-06-04 RX ADMIN — QUETIAPINE SCH MG: 25 TABLET, FILM COATED ORAL at 17:20

## 2020-06-04 RX ADMIN — QUETIAPINE SCH MG: 25 TABLET, FILM COATED ORAL at 12:22

## 2020-06-04 RX ADMIN — Medication SCH ML: at 17:20

## 2020-06-04 RX ADMIN — Medication SCH MCG: at 10:43

## 2020-06-04 NOTE — NUR
RN TELE CLOSING NOTES

 PATIENT IN BED AWAKE ALERT AND ORIENTED X1, CONFUSED , ALERT  TO NAME ONLY. RESPIRATIONS 
EVEN AND UNLABORED WITH EQUAL RISE AND FALL OF CHEST, NO S/S OF PAIN OR DISCOMFORT PRESENT, 
ON CARDIAC TELE MONITOR SR 66, NOTED WITH EPISODES OF MARTINA 55, NO S/S OF DISTRESS, LUCAS 
CATHETER INTACT AND PATENT DRAINING WELL NOTED URINE HARESH BROWN COLOR WITH SEDIMENTS 300CC 
OUTPUT. FLUIDS OFFERED AND TOLERATED WELL. SNACK OFFERED HAD A FEW SPOONS OF APPLE SAUCE. 
LEFT WRIST #20G IV SITE INTACT AND PATENT, IVF RUNNING AS ORDERED, NO REDNESS, NO 
INFILTRATION PRESENT,  CALL LIGHT  KEPT WITHIN REACH, ALL NEEDS ATTENDED AT THIS TIME, 
REMAINS COMFORTABLE WILL CONTINUE TO MONITOR.

## 2020-06-04 NOTE — NUR
RN TELE OPENING NOTES

 PATIENT IN BED AWAKE ALERT AND ORIENTED X1, CONFUSED , ALERT  TO NAME ONLY. RESPIRATIONS 
EVEN AND UNLABORED WITH EQUAL RISE AND FALL OF CHEST, NO S/S OF PAIN OR DISCOMFORT PRESENT, 
ON CARDIAC TELE MONITOR SR 62, NO S/S OF DISTRESS, LUCAS CATHETER INTACT AND PATENT DRAINING 
WELL NOTED URINE HARESH BROWN COLOR WITH SEDIMENTS, FLUIDS AND ENSURE OFFERED AND TOLERATED 
WELL. LEFT FA#22G IV SITE INTACT AND PATENT, IVF RUNNING AS ORDERED, NO REDNESS, NO 
INFILTRATION PRESENT,  CALL LIGHT  KEPT WITHIN REACH, ALL NEEDS ATTENDED AT THIS TIME, 
REMAINS COMFORTABLE WILL CONTINUE TO MONITOR. FALL PRECAUTIONS RENDERED.

## 2020-06-04 NOTE — NUR
TELE RN NOTES



OBSERVED PATIENT GETTING OUT OF BED, STOOD UP AND WAS FEEDING SELF, THEN MARCHING IN PLACE. 
REDIRECTED PATIENT PATIENT BACK TO BED. PATIENT REQUIRES FREQUENT RE-ORIENTATION. ABLE TO BE 
REDIRECTED DESPITE OF CONFUSION. FREQUENT VISUAL CHECK DONE. BED ALARM ON. BED IN LOWEST 
POSITION, LOCKED.

## 2020-06-04 NOTE — NUR
TELE RN CLOSING NOTES



PATIENT RESTING IN BED. PATIENT SHOWS NO SIGNS OF DISTRESS. NO SOB. BREATHING IS UNLABORED 
AND EQUAL BILATERALLY. PATIENT IS CONFUSED. PATIENT PULLED IV LINE OUT. INSERTED TWO IV 
LINES. ONE ON LEFT FOREARM AND ONE ON RIGHT FOREARM. PATIENT LATER REMOVED BOTH IV LINES. 
INSERTED ANOTHER ONE ON THE LEFT FOREARM. ADMINISTERED FALL PRECAUTIONS. SIDE RAILS UP, BED 
IN LOWEST POSITION, AND CALL LIGHT NEAR.

## 2020-06-04 NOTE — NUR
TELE RN NOTES



PATIENT ATTEMPTED TO GET OUT OF BED, ASSISTED BACK TO BED, AND RE-ORIENTED PATIENT.

## 2020-06-05 VITALS — SYSTOLIC BLOOD PRESSURE: 115 MMHG | DIASTOLIC BLOOD PRESSURE: 64 MMHG

## 2020-06-05 VITALS — SYSTOLIC BLOOD PRESSURE: 109 MMHG | DIASTOLIC BLOOD PRESSURE: 64 MMHG

## 2020-06-05 VITALS — DIASTOLIC BLOOD PRESSURE: 64 MMHG | SYSTOLIC BLOOD PRESSURE: 109 MMHG

## 2020-06-05 VITALS — DIASTOLIC BLOOD PRESSURE: 71 MMHG | SYSTOLIC BLOOD PRESSURE: 124 MMHG

## 2020-06-05 RX ADMIN — Medication SCH MCG: at 09:03

## 2020-06-05 RX ADMIN — Medication SCH ML: at 16:33

## 2020-06-05 RX ADMIN — QUETIAPINE SCH MG: 25 TABLET, FILM COATED ORAL at 12:53

## 2020-06-05 RX ADMIN — QUETIAPINE SCH MG: 25 TABLET, FILM COATED ORAL at 16:32

## 2020-06-05 RX ADMIN — SODIUM CHLORIDE PRN MLS/HR: 9 INJECTION, SOLUTION INTRAVENOUS at 04:15

## 2020-06-05 RX ADMIN — Medication SCH ML: at 12:54

## 2020-06-05 RX ADMIN — QUETIAPINE SCH MG: 25 TABLET, FILM COATED ORAL at 09:03

## 2020-06-05 RX ADMIN — Medication SCH ML: at 10:20

## 2020-06-05 NOTE — NUR
RN TELE CLOSING NOTES

 PATIENT IN BED AWAKE ALERT AND ORIENTED X1, CONFUSED , ALERT  TO NAME ONLY. RESPIRATIONS 
EVEN AND UNLABORED WITH EQUAL RISE AND FALL OF CHEST, NO S/S OF PAIN OR DISCOMFORT PRESENT, 
ON CARDIAC TELE MONITOR SR 60 WITH EPISODES OD MARTINA WHEN SLEEPING, NO S/S OF DISTRESS, 
LUCAS CATHETER INTACT AND PATENT DRAINING 700 CC OUTPUT NOTED URINE HARESH  COLOR WITH 
SEDIMENTS, FLUIDS AND ENSURE OFFERED AND TOLERATED WELL DRANK ONE ENSURE . LEFT FA#22G IV 
SITE INTACT AND PATENT, IVF RUNNING AS ORDERED, NO REDNESS, NO INFILTRATION PRESENT,  CALL 
LIGHT  KEPT WITHIN REACH, ALL NEEDS ATTENDED AT THIS TIME, REMAINS COMFORTABLE WILL CONTINUE 
TO MONITOR. FALL PRECAUTIONS RENDERED AND ENDORSE TO NEXT SHIFT.

## 2020-06-05 NOTE — NUR
MS/RN REPORT GIVEN TO ACCEPTING FACILITY [facility] TO DMITRY LOPEZ. PT REFUSED PICTURES ON 
DISCHARGE. EXIT CARE, PRESCRIPTIONS, H&P PREPARED FOR FACILITY.AMBULANCE SCHEDULED FOR 3668

## 2020-06-05 NOTE — NUR
WOUND CARE CONSULT: PT PRESENTS WITH SACRAL SCARRING, PRESENT ON ADMISSION. PT NOTED TO BE 
MOVING ABOUT IN BED. LUCAS CATH NOTED. RECOMMENDATIONS MADE FOR SKIN PROTECTION. DISCUSSED 
WITH NURSING STAFF. WILL SEE PRN. MD IN AGREEMENT WITH PLAN OF CARE.

## 2020-06-26 ENCOUNTER — HOSPITAL ENCOUNTER (INPATIENT)
Dept: HOSPITAL 54 - ER | Age: 75
LOS: 5 days | Discharge: NURSING, REHAB OR LONG-TERM CARE | DRG: 871 | End: 2020-07-01
Attending: NURSE PRACTITIONER | Admitting: NURSE PRACTITIONER
Payer: MEDICARE

## 2020-06-26 VITALS — BODY MASS INDEX: 16.39 KG/M2 | HEIGHT: 66 IN | WEIGHT: 102 LBS

## 2020-06-26 DIAGNOSIS — N17.0: ICD-10-CM

## 2020-06-26 DIAGNOSIS — R17: ICD-10-CM

## 2020-06-26 DIAGNOSIS — B95.2: ICD-10-CM

## 2020-06-26 DIAGNOSIS — E86.0: ICD-10-CM

## 2020-06-26 DIAGNOSIS — J96.91: ICD-10-CM

## 2020-06-26 DIAGNOSIS — N39.0: ICD-10-CM

## 2020-06-26 DIAGNOSIS — I25.10: ICD-10-CM

## 2020-06-26 DIAGNOSIS — F03.90: ICD-10-CM

## 2020-06-26 DIAGNOSIS — E80.6: ICD-10-CM

## 2020-06-26 DIAGNOSIS — E86.1: ICD-10-CM

## 2020-06-26 DIAGNOSIS — E43: ICD-10-CM

## 2020-06-26 DIAGNOSIS — E11.649: ICD-10-CM

## 2020-06-26 DIAGNOSIS — G92: ICD-10-CM

## 2020-06-26 DIAGNOSIS — J44.0: ICD-10-CM

## 2020-06-26 DIAGNOSIS — E87.0: ICD-10-CM

## 2020-06-26 DIAGNOSIS — J44.1: ICD-10-CM

## 2020-06-26 DIAGNOSIS — A41.9: Primary | ICD-10-CM

## 2020-06-26 LAB
ALBUMIN SERPL BCP-MCNC: 3.4 G/DL (ref 3.4–5)
ALP SERPL-CCNC: 120 U/L (ref 46–116)
ALT SERPL W P-5'-P-CCNC: 48 U/L (ref 12–78)
AST SERPL W P-5'-P-CCNC: 23 U/L (ref 15–37)
BASOPHILS # BLD AUTO: 0 /CMM (ref 0–0.2)
BASOPHILS NFR BLD AUTO: 0.3 % (ref 0–2)
BILIRUB DIRECT SERPL-MCNC: 0.5 MG/DL (ref 0–0.2)
BILIRUB SERPL-MCNC: 1.4 MG/DL (ref 0.2–1)
BUN SERPL-MCNC: 46 MG/DL (ref 7–18)
CALCIUM SERPL-MCNC: 9.7 MG/DL (ref 8.5–10.1)
CHLORIDE SERPL-SCNC: 119 MMOL/L (ref 98–107)
CO2 SERPL-SCNC: 29 MMOL/L (ref 21–32)
CREAT SERPL-MCNC: 1.6 MG/DL (ref 0.6–1.3)
EOSINOPHIL NFR BLD AUTO: 0.8 % (ref 0–6)
GLUCOSE SERPL-MCNC: 122 MG/DL (ref 74–106)
HCT VFR BLD AUTO: 48 % (ref 39–51)
HGB BLD-MCNC: 15.4 G/DL (ref 13.5–17.5)
LYMPHOCYTES NFR BLD AUTO: 14.4 % (ref 20–44)
LYMPHOCYTES NFR BLD AUTO: 2.4 /CMM (ref 0.8–4.8)
MCHC RBC AUTO-ENTMCNC: 32 G/DL (ref 31–36)
MCV RBC AUTO: 86 FL (ref 80–96)
MONOCYTES NFR BLD AUTO: 0.7 /CMM (ref 0.1–1.3)
MONOCYTES NFR BLD AUTO: 3.9 % (ref 2–12)
NEUTROPHILS # BLD AUTO: 13.7 /CMM (ref 1.8–8.9)
NEUTROPHILS NFR BLD AUTO: 80.6 % (ref 43–81)
NT-PROBNP SERPL-MCNC: 648 PG/ML (ref 0–125)
PLATELET # BLD AUTO: 292 /CMM (ref 150–450)
POTASSIUM SERPL-SCNC: 3.8 MMOL/L (ref 3.5–5.1)
PROT SERPL-MCNC: 8 G/DL (ref 6.4–8.2)
RBC # BLD AUTO: 5.6 MIL/UL (ref 4.5–6)
SODIUM SERPL-SCNC: 158 MMOL/L (ref 136–145)
WBC NRBC COR # BLD AUTO: 17 K/UL (ref 4.3–11)

## 2020-06-26 PROCEDURE — G0378 HOSPITAL OBSERVATION PER HR: HCPCS

## 2020-06-26 NOTE — NUR
JUHI FROM [facility] C/O LOW O2 SATURATION. PT ARRIVED TO [facility] ER WITH O2 
SAT 77%. PT AWAKE AND CONFUSED. SKIN COOL AND VERY FRAIL. RESPIRATIONS EVEN AND 
UNLABORED. SKIN INTACT. NO ACUTE DISTRESS NOTED AT THIS TIME. PT PLACED IN GOWN 
AND ON MONITOR, WILL CONTINUE TO MONITOR

## 2020-06-27 VITALS — SYSTOLIC BLOOD PRESSURE: 133 MMHG | DIASTOLIC BLOOD PRESSURE: 90 MMHG

## 2020-06-27 VITALS — DIASTOLIC BLOOD PRESSURE: 73 MMHG | SYSTOLIC BLOOD PRESSURE: 97 MMHG

## 2020-06-27 VITALS — DIASTOLIC BLOOD PRESSURE: 87 MMHG | SYSTOLIC BLOOD PRESSURE: 134 MMHG

## 2020-06-27 VITALS — SYSTOLIC BLOOD PRESSURE: 151 MMHG | DIASTOLIC BLOOD PRESSURE: 88 MMHG

## 2020-06-27 VITALS — DIASTOLIC BLOOD PRESSURE: 88 MMHG | SYSTOLIC BLOOD PRESSURE: 127 MMHG

## 2020-06-27 VITALS — SYSTOLIC BLOOD PRESSURE: 119 MMHG | DIASTOLIC BLOOD PRESSURE: 90 MMHG

## 2020-06-27 LAB
APPEARANCE UR: (no result)
APPEARANCE UR: (no result)
BILIRUB UR QL STRIP: NEGATIVE
BILIRUB UR QL STRIP: NEGATIVE
COLOR UR: YELLOW
COLOR UR: YELLOW
CREAT UR-MCNC: 136.9 MG/DL (ref 30–125)
DEPRECATED SQUAMOUS URNS QL MICRO: (no result) /HPF
GLUCOSE UR STRIP-MCNC: NEGATIVE MG/DL
GLUCOSE UR STRIP-MCNC: NEGATIVE MG/DL
HGB UR QL STRIP: (no result) ERY/UL
HGB UR QL STRIP: (no result) ERY/UL
KETONES UR STRIP-MCNC: NEGATIVE MG/DL
KETONES UR STRIP-MCNC: NEGATIVE MG/DL
LEUKOCYTE ESTERASE UR QL STRIP: (no result)
LEUKOCYTE ESTERASE UR QL STRIP: (no result)
NITRITE UR QL STRIP: POSITIVE
NITRITE UR QL STRIP: POSITIVE
PH UR STRIP: 6 [PH] (ref 5–8)
PH UR STRIP: 6 [PH] (ref 5–8)
PROT UR QL STRIP: (no result) MG/DL
PROT UR QL STRIP: 30 MG/DL
PROT UR-MCNC: 73.3 MG/DL (ref 0–11.9)
SODIUM UR-SCNC: 25 MMOL/L (ref 40–220)
UROBILINOGEN UR STRIP-MCNC: 1 EU/DL
UROBILINOGEN UR STRIP-MCNC: 2 EU/DL
WBC #/AREA URNS HPF: (no result) /HPF (ref 0–3)
WBC #/AREA URNS HPF: (no result) /HPF (ref 0–3)

## 2020-06-27 RX ADMIN — DEXTROSE MONOHYDRATE PRN ML: 500 INJECTION PARENTERAL at 07:46

## 2020-06-27 RX ADMIN — PIPERACILLIN SODIUM AND TAZOBACTAM SODIUM SCH MLS/HR: .375; 3 INJECTION, POWDER, LYOPHILIZED, FOR SOLUTION INTRAVENOUS at 23:54

## 2020-06-27 RX ADMIN — PIPERACILLIN SODIUM AND TAZOBACTAM SODIUM SCH MLS/HR: .375; 3 INJECTION, POWDER, LYOPHILIZED, FOR SOLUTION INTRAVENOUS at 12:23

## 2020-06-27 RX ADMIN — Medication SCH EACH: at 07:30

## 2020-06-27 RX ADMIN — QUETIAPINE SCH MG: 25 TABLET, FILM COATED ORAL at 21:45

## 2020-06-27 RX ADMIN — Medication SCH EACH: at 12:17

## 2020-06-27 RX ADMIN — INSULIN HUMAN PRN UNIT: 100 INJECTION, SOLUTION PARENTERAL at 17:36

## 2020-06-27 RX ADMIN — QUETIAPINE SCH MG: 25 TABLET, FILM COATED ORAL at 12:23

## 2020-06-27 RX ADMIN — DEXTROSE AND SODIUM CHLORIDE PRN MLS/HR: 5; 450 INJECTION, SOLUTION INTRAVENOUS at 16:55

## 2020-06-27 RX ADMIN — ENOXAPARIN SODIUM SCH MG: 40 INJECTION SUBCUTANEOUS at 08:52

## 2020-06-27 RX ADMIN — Medication SCH EACH: at 17:34

## 2020-06-27 RX ADMIN — Medication SCH EACH: at 22:21

## 2020-06-27 RX ADMIN — PANTOPRAZOLE SODIUM SCH MG: 40 TABLET, DELAYED RELEASE ORAL at 07:46

## 2020-06-27 RX ADMIN — QUETIAPINE SCH MG: 25 TABLET, FILM COATED ORAL at 17:12

## 2020-06-27 RX ADMIN — QUETIAPINE SCH MG: 25 TABLET, FILM COATED ORAL at 08:51

## 2020-06-27 RX ADMIN — INSULIN HUMAN PRN UNIT: 100 INJECTION, SOLUTION PARENTERAL at 22:24

## 2020-06-27 RX ADMIN — DEXTROSE AND SODIUM CHLORIDE PRN MLS/HR: 5; 450 INJECTION, SOLUTION INTRAVENOUS at 03:51

## 2020-06-27 RX ADMIN — PIPERACILLIN SODIUM AND TAZOBACTAM SODIUM SCH MLS/HR: .375; 3 INJECTION, POWDER, LYOPHILIZED, FOR SOLUTION INTRAVENOUS at 17:15

## 2020-06-27 NOTE — NUR
RN OPENING NOTES:



PATIENT IN BED, AWAKE, AND RESPONSIVE. CONFUSED. ON O2 AT 2LPM VIA NC, TOLERATING WELL, SPO2 
>95%. UNLABORED BREATHING. NO C/O PAIN. ON CARDIAC MONITOR. LUCAS CATH INTACT AND PATENT, 
DRAINING CLEAR YELLOW URINE. SAFETY PRECAUTIONS IMPLEMENTED. BED LOCKED AND LOW POSITION. 
SIDE RAILS X 2 UP. HOB ELEVATED. ON BILATERAL SOFT WRIST RESTRAINTS. ASSESSED SKIN WITH GOOD 
CIRCULATION. CALL LIGHT WITHIN REACH. WILL CONT. TO MONITOR.

-------------------------------------------------------------------------------

Addendum: 06/28/20 at 0657 by SANDRO WHITLOCK RN

-------------------------------------------------------------------------------

CORRECTION: PATIENT NOTED WITH TEA COLOR URINE.

## 2020-06-27 NOTE — NUR
RN NOTE:



BLOOD SUGAR CHECKED: 134. PATIENT PROVIDED WITH SNACKS (PUDDING AND WATER). PATIENT 
TOLERATED PO INTAKE WELL. WILL CONT. TO MONITOR.

-------------------------------------------------------------------------------

Addendum: 06/28/20 at 0048 by SANDRO WHITLOCK RN

-------------------------------------------------------------------------------

CORRECTION. . PATIENT ALSO DRANK ORANGE JUICE. WILL MONITOR FOR HYPOGLYCEMIA.

## 2020-06-27 NOTE — NUR
TELE/RN NOTES

 MG/DL INSULIN IS NOT ADMINISTERED DUE TO PATIENT IS REFUSING TO EAT. EXPLAINED THE 
RISK AND BENEFITS. WILL CONTINUE TO MONITOR.

## 2020-06-27 NOTE — NUR
TELE/RN NOTES

BS 69MG/DL GIVE A GLASS OF ORANGE WITH SUGAR WILL CHECKED AFTER 30MIN. WILL CONTINUE TO 
MONITOR.

## 2020-06-27 NOTE — NUR
PATIENT PULLING OUT OXYGEN AND DESATURATES AT ROOM AIR TO 80,S,UNABLE TO FOLLOW 
INSTRUCTIONS,REVIEWED CODE STATUS WITH MD AND OBTAINED ORDERS FOR RESTRAINT FOR SAFETY AND 
DNR PER POLST,RECORD UPDATED,PRIMARY RN AWARE.WILL KEEP OXYGEN N/C TO KEEEP SAT ABOVE 92%.

## 2020-06-27 NOTE — NUR
TELE RN NOTES

LIAO WAS TAKEN FOR PATIENT IN ROOM [address]  [address] [facility] AND RETURNED TO [facility] BY BRIANNA BARBOSA. SINCE THE PATIENT WAS DISCHARGED RN WAS NOT ABLE TO TAKE THE KEYS FROM [facility] FOR 
ROOM [address] [facility].

## 2020-06-27 NOTE — NUR
TELE/RN OPENING NOTES

RECEIVED PATIENT ON BED. NO SIGN AND SYMPTOM OF PAIN NOTED. NO APPARENT RESPIRATORY DISTRESS 
NOTED. FALL RISK. BED IN LOW POSITION AND LOCKED.CALL LIGHT WITHIN EASY REACH. WILL CONTINUE 
TO MONITOR.

## 2020-06-27 NOTE — NUR
TELE/RN NOTES

RECHECKED BS 135MG/DL NO INSULIN WAS GIVEN DUE TO PATIENT IS REFUSING TO EAT. EXPLAINED THE 
RISK AND BENEFITS PATIENT STILL REFUSING AND SPITTING  THE FOOD. WILL CONTINUE TO MONITOR.

## 2020-06-27 NOTE — NUR
TELE RN CLOSING NOTES: PATIENT IN BED, AWAKE CONFUSED. NO SOB NOTED. NO COMPLAIN OF PAIN. 
CALL LIGHT WITHIN REACH. BED ALARM ON. BED IN LOWEST AND LOCKED POSITION.

## 2020-06-27 NOTE — NUR
TELE/RN CLOSING 

PATIENT ON BED. NO SIGN AND SYMPTOM OF PAIN NOTED. NO APPARENT RESPIRATORY DISTRESS NOTED. 
PATIENT IS ON TELE MONITOR SR- ST 93- 108BPM. IV ACCESS AT THE RIGHT FOREARM # 20 G WITH D5 
1/2 NS 1L AT 75 ML/HOUR ON AND INFUSING WELL. SEEN AND EXAMINED BY MD WITH ORDERS MADE AND 
CARRIED OUT. ALL DUE MEDICATION WAS GIVEN. CHECKED AND TURNED  PATIENT EVERY 2 HOURS. FALL 
RISK. BED IN LOW POSITION AND LOCKED. CALL LIGHT WITHIN EASY REACH. WILL ENDORSED TO NIGHT 
SHIFT FOR JUAN..

## 2020-06-28 VITALS — SYSTOLIC BLOOD PRESSURE: 132 MMHG | DIASTOLIC BLOOD PRESSURE: 92 MMHG

## 2020-06-28 VITALS — DIASTOLIC BLOOD PRESSURE: 85 MMHG | SYSTOLIC BLOOD PRESSURE: 124 MMHG

## 2020-06-28 VITALS — DIASTOLIC BLOOD PRESSURE: 63 MMHG | SYSTOLIC BLOOD PRESSURE: 144 MMHG

## 2020-06-28 VITALS — SYSTOLIC BLOOD PRESSURE: 138 MMHG | DIASTOLIC BLOOD PRESSURE: 85 MMHG

## 2020-06-28 VITALS — SYSTOLIC BLOOD PRESSURE: 130 MMHG | DIASTOLIC BLOOD PRESSURE: 70 MMHG

## 2020-06-28 VITALS — SYSTOLIC BLOOD PRESSURE: 139 MMHG | DIASTOLIC BLOOD PRESSURE: 88 MMHG

## 2020-06-28 LAB
ALBUMIN SERPL BCP-MCNC: 2.6 G/DL (ref 3.4–5)
ALP SERPL-CCNC: 92 U/L (ref 46–116)
ALT SERPL W P-5'-P-CCNC: 46 U/L (ref 12–78)
AST SERPL W P-5'-P-CCNC: 23 U/L (ref 15–37)
BASOPHILS # BLD AUTO: 0 /CMM (ref 0–0.2)
BASOPHILS NFR BLD AUTO: 0 % (ref 0–2)
BILIRUB SERPL-MCNC: 1 MG/DL (ref 0.2–1)
BUN SERPL-MCNC: 30 MG/DL (ref 7–18)
CALCIUM SERPL-MCNC: 8.5 MG/DL (ref 8.5–10.1)
CHLORIDE SERPL-SCNC: 114 MMOL/L (ref 98–107)
CHOLEST SERPL-MCNC: 113 MG/DL (ref ?–200)
CK SERPL-CCNC: 49 U/L (ref 39–308)
CO2 SERPL-SCNC: 28 MMOL/L (ref 21–32)
CREAT SERPL-MCNC: 1.4 MG/DL (ref 0.6–1.3)
EOSINOPHIL NFR BLD AUTO: 0 % (ref 0–6)
GLUCOSE SERPL-MCNC: 166 MG/DL (ref 74–106)
HCT VFR BLD AUTO: 37 % (ref 39–51)
HDLC SERPL-MCNC: 33 MG/DL (ref 40–60)
HGB BLD-MCNC: 12.1 G/DL (ref 13.5–17.5)
LDLC SERPL DIRECT ASSAY-MCNC: 73 MG/DL (ref 0–99)
LYMPHOCYTES NFR BLD AUTO: 0.9 /CMM (ref 0.8–4.8)
LYMPHOCYTES NFR BLD AUTO: 10.4 % (ref 20–44)
MAGNESIUM SERPL-MCNC: 2.4 MG/DL (ref 1.8–2.4)
MCHC RBC AUTO-ENTMCNC: 33 G/DL (ref 31–36)
MCV RBC AUTO: 85 FL (ref 80–96)
MONOCYTES NFR BLD AUTO: 0.2 /CMM (ref 0.1–1.3)
MONOCYTES NFR BLD AUTO: 2.4 % (ref 2–12)
NEUTROPHILS # BLD AUTO: 7.6 /CMM (ref 1.8–8.9)
NEUTROPHILS NFR BLD AUTO: 87.2 % (ref 43–81)
PHOSPHATE SERPL-MCNC: 3.3 MG/DL (ref 2.5–4.9)
PLATELET # BLD AUTO: 180 /CMM (ref 150–450)
POTASSIUM SERPL-SCNC: 3.5 MMOL/L (ref 3.5–5.1)
PROT SERPL-MCNC: 6.5 G/DL (ref 6.4–8.2)
RBC # BLD AUTO: 4.37 MIL/UL (ref 4.5–6)
SODIUM SERPL-SCNC: 150 MMOL/L (ref 136–145)
TRIGL SERPL-MCNC: 82 MG/DL (ref 30–150)
TSH SERPL DL<=0.005 MIU/L-ACNC: 1.64 UIU/ML (ref 0.36–3.74)
WBC NRBC COR # BLD AUTO: 8.7 K/UL (ref 4.3–11)

## 2020-06-28 RX ADMIN — PIPERACILLIN SODIUM AND TAZOBACTAM SODIUM SCH MLS/HR: .375; 3 INJECTION, POWDER, LYOPHILIZED, FOR SOLUTION INTRAVENOUS at 05:05

## 2020-06-28 RX ADMIN — QUETIAPINE SCH MG: 25 TABLET, FILM COATED ORAL at 12:29

## 2020-06-28 RX ADMIN — Medication SCH EACH: at 22:14

## 2020-06-28 RX ADMIN — INSULIN HUMAN PRN UNIT: 100 INJECTION, SOLUTION PARENTERAL at 22:43

## 2020-06-28 RX ADMIN — DEXTROSE AND SODIUM CHLORIDE PRN MLS/HR: 5; 450 INJECTION, SOLUTION INTRAVENOUS at 20:19

## 2020-06-28 RX ADMIN — QUETIAPINE SCH MG: 25 TABLET, FILM COATED ORAL at 08:13

## 2020-06-28 RX ADMIN — Medication SCH EACH: at 08:00

## 2020-06-28 RX ADMIN — ENOXAPARIN SODIUM SCH MG: 40 INJECTION SUBCUTANEOUS at 08:16

## 2020-06-28 RX ADMIN — PANTOPRAZOLE SODIUM SCH MG: 40 TABLET, DELAYED RELEASE ORAL at 08:13

## 2020-06-28 RX ADMIN — Medication SCH EACH: at 07:30

## 2020-06-28 RX ADMIN — PIPERACILLIN SODIUM AND TAZOBACTAM SODIUM SCH MLS/HR: .375; 3 INJECTION, POWDER, LYOPHILIZED, FOR SOLUTION INTRAVENOUS at 12:29

## 2020-06-28 RX ADMIN — Medication SCH EACH: at 17:57

## 2020-06-28 RX ADMIN — QUETIAPINE SCH MG: 25 TABLET, FILM COATED ORAL at 22:14

## 2020-06-28 RX ADMIN — DEXTROSE AND SODIUM CHLORIDE PRN MLS/HR: 5; 450 INJECTION, SOLUTION INTRAVENOUS at 05:41

## 2020-06-28 RX ADMIN — PIPERACILLIN SODIUM AND TAZOBACTAM SODIUM SCH MLS/HR: .375; 3 INJECTION, POWDER, LYOPHILIZED, FOR SOLUTION INTRAVENOUS at 17:34

## 2020-06-28 RX ADMIN — QUETIAPINE SCH MG: 25 TABLET, FILM COATED ORAL at 17:34

## 2020-06-28 RX ADMIN — Medication SCH EACH: at 12:58

## 2020-06-28 NOTE — NUR
RN CLOSING NOTES:



PATIENT IS SLEEPING BUT EASILY AROUSABLE. . NO RESPIRATORY DISTRESS. NO S/S OF PAIN. CALL 
LIGHT PLACED WITHIN REACH. WILL ENDORSE TO AM SHIFT NURSE FOR CONTINUITY OF CARE.

## 2020-06-28 NOTE — NUR
RN NOTE:



BED BATH PROVIDED. PATIENT TOLERATED WELL. PATIENT ALSO GIVEN SNACKS AND ORANGE JUICE TO 
PREVENT HYPOGLYCEMIA IN THE MORNING. WILL CONT. TO MONITOR.

## 2020-06-28 NOTE — NUR
RN CLOSING NOTES:



PATIENT NOW ASLEEP BUT EASILY AROUSABLE. VERBALLY RESPONSIVE WITH EPISODES OF CONFUSION. NO 
RESPIRATORY DISTRESS. NO S/S OF PAIN. CALL LIGHT PLACED WITHIN REACH. WILL ENDORSE TO AM 
SHIFT NURSE FOR CONTINUITY OF CARE.

## 2020-06-28 NOTE — NUR
RN OPENING NOTES:



RECEIVED PATIENT IN BED, AOX 1, CONFUSED. ON O2 AT 2LPM VIA NC, TOLERATING WELL, SPO2 >95%. 
UNLABORED BREATHING. NO C/O PAIN. PATIENT ON TELE READING  NSR. LUCAS CATH INTACT AND 
PATENT, DRAINING CLEAR YELLOW URINE. SAFETY PRECAUTIONS IMPLEMENTED. BED LOCKED AND LOW 
POSITION. SIDE RAILS X 2 UP. HOB ELEVATED. ON BILATERAL SOFT WRIST RESTRAINTS. ASSESSED SKIN 
WITH GOOD CIRCULATION. CALL LIGHT WITHIN REACH. WILL CONT. TO MONITOR.

## 2020-06-28 NOTE — NUR
RN NOTES

LUCAS CATHETER ORDER. PATIENTS HAD 12 ML URINE IN BLADDER SCANNER. EXPERIENCE RESISTANCE. 
NOTIFIED MD LOUIS AND CHARGE NURSE, ENDORSED TO PM NURSE..

## 2020-06-28 NOTE — NUR
PATIENT TRIED OFF RESTRAINTS ,STILL UNABLE TO FOLLOW COMMAND ,TRY PULL LINES ,MD AWARE 
PLACED BACK ON RESTRAINTS FOR SAFETY.

## 2020-06-29 VITALS — DIASTOLIC BLOOD PRESSURE: 79 MMHG | SYSTOLIC BLOOD PRESSURE: 135 MMHG

## 2020-06-29 VITALS — DIASTOLIC BLOOD PRESSURE: 64 MMHG | SYSTOLIC BLOOD PRESSURE: 131 MMHG

## 2020-06-29 VITALS — SYSTOLIC BLOOD PRESSURE: 121 MMHG | DIASTOLIC BLOOD PRESSURE: 87 MMHG

## 2020-06-29 VITALS — DIASTOLIC BLOOD PRESSURE: 68 MMHG | SYSTOLIC BLOOD PRESSURE: 126 MMHG

## 2020-06-29 VITALS — SYSTOLIC BLOOD PRESSURE: 135 MMHG | DIASTOLIC BLOOD PRESSURE: 79 MMHG

## 2020-06-29 VITALS — SYSTOLIC BLOOD PRESSURE: 131 MMHG | DIASTOLIC BLOOD PRESSURE: 64 MMHG

## 2020-06-29 VITALS — DIASTOLIC BLOOD PRESSURE: 82 MMHG | SYSTOLIC BLOOD PRESSURE: 135 MMHG

## 2020-06-29 VITALS — DIASTOLIC BLOOD PRESSURE: 72 MMHG | SYSTOLIC BLOOD PRESSURE: 113 MMHG

## 2020-06-29 LAB
BASE EXCESS BLDA CALC-SCNC: -2 MMOL/L
BUN SERPL-MCNC: 26 MG/DL (ref 7–18)
CALCIUM SERPL-MCNC: 7.5 MG/DL (ref 8.5–10.1)
CHLORIDE SERPL-SCNC: 116 MMOL/L (ref 98–107)
CO2 SERPL-SCNC: 20 MMOL/L (ref 21–32)
CREAT SERPL-MCNC: 1 MG/DL (ref 0.6–1.3)
DO-HGB MFR BLDA: 29.7 MMHG
GLUCOSE SERPL-MCNC: 119 MG/DL (ref 74–106)
INHALED O2 CONCENTRATION: 40 %
PCO2 TEMP ADJ BLDA: 23 MMHG (ref 35–45)
PH TEMP ADJ BLDA: 7.53 [PH] (ref 7.35–7.45)
PO2 TEMP ADJ BLDA: 229.1 MMHG (ref 75–100)
POTASSIUM SERPL-SCNC: 3.4 MMOL/L (ref 3.5–5.1)
SAO2 % BLDA: 99.4 % (ref 92–98.5)
SODIUM SERPL-SCNC: 147 MMOL/L (ref 136–145)
VENTILATION MODE VENT: (no result)

## 2020-06-29 RX ADMIN — PIPERACILLIN SODIUM AND TAZOBACTAM SODIUM SCH MLS/HR: .375; 3 INJECTION, POWDER, LYOPHILIZED, FOR SOLUTION INTRAVENOUS at 17:11

## 2020-06-29 RX ADMIN — PANTOPRAZOLE SODIUM SCH MG: 40 TABLET, DELAYED RELEASE ORAL at 08:17

## 2020-06-29 RX ADMIN — DEXTROSE MONOHYDRATE PRN ML: 500 INJECTION PARENTERAL at 08:17

## 2020-06-29 RX ADMIN — AMOXICILLIN SCH MG: 250 CAPSULE ORAL at 21:14

## 2020-06-29 RX ADMIN — Medication SCH EACH: at 17:11

## 2020-06-29 RX ADMIN — Medication PRN EACH: at 14:58

## 2020-06-29 RX ADMIN — PIPERACILLIN SODIUM AND TAZOBACTAM SODIUM SCH MLS/HR: .375; 3 INJECTION, POWDER, LYOPHILIZED, FOR SOLUTION INTRAVENOUS at 06:27

## 2020-06-29 RX ADMIN — PIPERACILLIN SODIUM AND TAZOBACTAM SODIUM SCH MLS/HR: .375; 3 INJECTION, POWDER, LYOPHILIZED, FOR SOLUTION INTRAVENOUS at 00:14

## 2020-06-29 RX ADMIN — QUETIAPINE SCH MG: 25 TABLET, FILM COATED ORAL at 12:11

## 2020-06-29 RX ADMIN — Medication PRN EACH: at 08:18

## 2020-06-29 RX ADMIN — Medication SCH EACH: at 12:11

## 2020-06-29 RX ADMIN — PIPERACILLIN SODIUM AND TAZOBACTAM SODIUM SCH MLS/HR: .375; 3 INJECTION, POWDER, LYOPHILIZED, FOR SOLUTION INTRAVENOUS at 12:11

## 2020-06-29 RX ADMIN — INSULIN HUMAN PRN UNIT: 100 INJECTION, SOLUTION PARENTERAL at 22:37

## 2020-06-29 RX ADMIN — QUETIAPINE SCH MG: 25 TABLET, FILM COATED ORAL at 16:25

## 2020-06-29 RX ADMIN — QUETIAPINE SCH MG: 25 TABLET, FILM COATED ORAL at 08:18

## 2020-06-29 RX ADMIN — QUETIAPINE SCH MG: 25 TABLET, FILM COATED ORAL at 21:14

## 2020-06-29 RX ADMIN — Medication SCH EACH: at 22:37

## 2020-06-29 RX ADMIN — ENOXAPARIN SODIUM SCH MG: 40 INJECTION SUBCUTANEOUS at 08:19

## 2020-06-29 RX ADMIN — Medication SCH EACH: at 08:17

## 2020-06-29 NOTE — NUR
RN CLOSING NOTES



RECEIVED PATIENT IN BED, AWAKE, AND RESPONSIVE. AOX1. CONFUSED. WITH SCREAMING AND YELLING 
EPISODES, COMBATIVE AT TIMES ESPECIALLY DURING ADL CARE.  ON O2 AT 2LPM VIA NC, TOLERATING 
WELL, SPO2 >95%. BREATHING EVEN AND UNLABORED. NO SOB NOTED. ON CARDIAC TELE MONITOR SHOWING 
SINUS MARTINA WITH HR OF 57. IIV ACCESS NOTED ON L FOREARM G20. INTACT AND PATENT AND FLUSHING 
WELL. NO S/S OF INFECTION OR INFILTRATION NOTED. LUCAS CATH IN PLACE. INTACT AND PATENT AND 
DRAINING WITH CLEAR YELLOW URINE. BILATERAL SOFT WRIST RESTRAINTS NOTED UPON ROUNDS, SKIN 
CHECK DONE, GOOD CIRCULATION NOTED. WITH NO S/S OF SKIN BREAKDOWN UNDERNEATH RESTRAINTS. 
SAFETY PRECAUTIONS IN PLACE. BED LOCKED AND LOW POSITION. SIDE RAILS X 2 UP. HOB ELEVATED. 
ON BILATERAL SOFT WRIST RESTRAINTS. ASSESSED SKIN WITH GOOD CIRCULATION. CALL LIGHT WITHIN 
REACH. WILL CONT. TO MONITOR.

## 2020-06-29 NOTE — NUR
RN CLOSING NOTE



PT REMAINED STABLE DURING MY SHIFT . VSS. NO ACUTE CHANGES. WILL ENDORSE TO INCOMING SHIFT 
FOR JUAN.

## 2020-06-29 NOTE — NUR
HYPOGLYCEMIA



BLOOD SUGAR CHECKED THIS AM, NOTED TO BE 65. ADMINISTERED D50. WILL REASSESS BS IN 1 HR. PT 
IS STILL AWAKE  AND ALERT. AT THIS TIME HE IS ASYMPTOMATIC. NO SWEATING NOTED. NO COLD 
CLAMMY SKIN. ALSO ATTEMPTED TO ADMINISTER OJ BUT PT IS REFUSING TO DRINK AND AT THE SAME 
TIME HE WAS SPITTING AT ME. WILL REASSESS BS.

## 2020-06-29 NOTE — NUR
RN OPENING NOTES



RECEIVED PATIENT IN BED, AWAKE, AND RESPONSIVE. AOX1. CONFUSED. WITH SCREAMING AND YELLING 
EPISODES.  ON O2 AT 2LPM VIA NC, TOLERATING WELL, SPO2 >95%. UNLABORED BREATHING. NO SOB 
NOTED. ON CARDIAC TELE MONITOR SHOWING SINUS MARTINA WITH HR OF 57. IV ACCESS UPON INITIAL 
ROUNDS WAS NOT PATENT,INFILTRATED ON R FOREARM. APPLIED ICE TO SITE, ELEVATED TO PILLOWS. PT 
HAS NO LUCAS CATH, AND ACCORDING TO THE NIGHT NURSE, LUCAS CATH CAME OFF AT AROUND 9PM LAST 
NIGHT AND THAT SHE WAS UNABLE TO RE-INSERT. PER NIGHT NURSE, PT VOIDED THROUGHOUT THE SHIFT, 
BUT THEN APPARENTLY ON CALL MD STILL WANTS LUCAS RE-INSERTED. BILATERAL SOFT WRIST 
RESTRAINTS NOTED UPON ROUNDS, SKIN CHECK DONE, GOOD CIRCULATION NOTED. WITH NO S/S OF SKIN 
BREAKDOWN UNDERNEATH RESTRAINTS. SAFETY PRECAUTIONS IN PLACE. BED LOCKED AND LOW POSITION. 
SIDE RAILS X 2 UP. HOB ELEVATED. ON BILATERAL SOFT WRIST RESTRAINTS. ASSESSED SKIN WITH GOOD 
CIRCULATION. CALL LIGHT WITHIN REACH. WILL CONT. TO MONITOR.

## 2020-06-29 NOTE — NUR
URINE CULTURE



DR. LAMBERT MADE AWARE REGARDING URINE CULTURE RESULTS OF 50,000 COLONIES OF ENTEROCOCCUS

## 2020-06-30 VITALS — DIASTOLIC BLOOD PRESSURE: 93 MMHG | SYSTOLIC BLOOD PRESSURE: 145 MMHG

## 2020-06-30 VITALS — DIASTOLIC BLOOD PRESSURE: 76 MMHG | SYSTOLIC BLOOD PRESSURE: 144 MMHG

## 2020-06-30 VITALS — SYSTOLIC BLOOD PRESSURE: 139 MMHG | DIASTOLIC BLOOD PRESSURE: 78 MMHG

## 2020-06-30 VITALS — SYSTOLIC BLOOD PRESSURE: 156 MMHG | DIASTOLIC BLOOD PRESSURE: 72 MMHG

## 2020-06-30 VITALS — DIASTOLIC BLOOD PRESSURE: 88 MMHG | SYSTOLIC BLOOD PRESSURE: 157 MMHG

## 2020-06-30 VITALS — DIASTOLIC BLOOD PRESSURE: 72 MMHG | SYSTOLIC BLOOD PRESSURE: 156 MMHG

## 2020-06-30 VITALS — DIASTOLIC BLOOD PRESSURE: 87 MMHG | SYSTOLIC BLOOD PRESSURE: 133 MMHG

## 2020-06-30 LAB
ALBUMIN SERPL ELPH-MCNC: 2.5 G/DL (ref 2.9–4.4)
ALBUMIN/GLOB SERPL: 0.8 {RATIO} (ref 0.7–1.7)
ALPHA1 GLOB SERPL ELPH-MCNC: 0.3 G/DL (ref 0–0.4)
ALPHA2 GLOB SERPL ELPH-MCNC: 0.8 G/DL (ref 0.4–1)
B-GLOBULIN SERPL ELPH-MCNC: 0.8 G/DL (ref 0.7–1.3)
BASOPHILS # BLD AUTO: 0 /CMM (ref 0–0.2)
BASOPHILS NFR BLD AUTO: 0 % (ref 0–2)
BUN SERPL-MCNC: 25 MG/DL (ref 7–18)
CALCIUM SERPL-MCNC: 8.7 MG/DL (ref 8.5–10.1)
CHLORIDE SERPL-SCNC: 110 MMOL/L (ref 98–107)
CO2 SERPL-SCNC: 23 MMOL/L (ref 21–32)
CREAT SERPL-MCNC: 1 MG/DL (ref 0.6–1.3)
EOSINOPHIL NFR BLD AUTO: 0 % (ref 0–6)
GAMMA GLOB SERPL ELPH-MCNC: 1.3 G/DL (ref 0.4–1.8)
GLOBULIN SER CALC-MCNC: 3.2 G/DL (ref 2.2–3.9)
GLUCOSE SERPL-MCNC: 121 MG/DL (ref 74–106)
HCT VFR BLD AUTO: 40 % (ref 39–51)
HGB BLD-MCNC: 12.6 G/DL (ref 13.5–17.5)
LYMPHOCYTES NFR BLD AUTO: 0.9 /CMM (ref 0.8–4.8)
LYMPHOCYTES NFR BLD AUTO: 7.9 % (ref 20–44)
MAGNESIUM SERPL-MCNC: 2.4 MG/DL (ref 1.8–2.4)
MCHC RBC AUTO-ENTMCNC: 32 G/DL (ref 31–36)
MCV RBC AUTO: 86 FL (ref 80–96)
MONOCYTES NFR BLD AUTO: 0.3 /CMM (ref 0.1–1.3)
MONOCYTES NFR BLD AUTO: 2.5 % (ref 2–12)
NEUTROPHILS # BLD AUTO: 10.6 /CMM (ref 1.8–8.9)
NEUTROPHILS NFR BLD AUTO: 89.6 % (ref 43–81)
PHOSPHATE SERPL-MCNC: 3.1 MG/DL (ref 2.5–4.9)
PLATELET # BLD AUTO: 184 /CMM (ref 150–450)
POTASSIUM SERPL-SCNC: 4.4 MMOL/L (ref 3.5–5.1)
PTH-INTACT SERPL-MCNC: 41 PG/ML (ref 15–65)
RBC # BLD AUTO: 4.62 MIL/UL (ref 4.5–6)
SODIUM SERPL-SCNC: 143 MMOL/L (ref 136–145)
WBC NRBC COR # BLD AUTO: 11.9 K/UL (ref 4.3–11)

## 2020-06-30 RX ADMIN — QUETIAPINE SCH MG: 25 TABLET, FILM COATED ORAL at 21:16

## 2020-06-30 RX ADMIN — DEXTROSE MONOHYDRATE PRN ML: 500 INJECTION PARENTERAL at 09:04

## 2020-06-30 RX ADMIN — INSULIN HUMAN PRN UNIT: 100 INJECTION, SOLUTION PARENTERAL at 11:37

## 2020-06-30 RX ADMIN — INSULIN HUMAN PRN UNIT: 100 INJECTION, SOLUTION PARENTERAL at 18:36

## 2020-06-30 RX ADMIN — Medication SCH EACH: at 11:36

## 2020-06-30 RX ADMIN — Medication SCH EACH: at 17:20

## 2020-06-30 RX ADMIN — QUETIAPINE SCH MG: 25 TABLET, FILM COATED ORAL at 12:47

## 2020-06-30 RX ADMIN — DEXTROSE MONOHYDRATE PRN ML: 500 INJECTION PARENTERAL at 08:15

## 2020-06-30 RX ADMIN — AMOXICILLIN SCH MG: 250 CAPSULE ORAL at 05:44

## 2020-06-30 RX ADMIN — PANTOPRAZOLE SODIUM SCH MG: 40 TABLET, DELAYED RELEASE ORAL at 08:14

## 2020-06-30 RX ADMIN — Medication SCH EACH: at 08:14

## 2020-06-30 RX ADMIN — Medication SCH EACH: at 22:04

## 2020-06-30 RX ADMIN — ENOXAPARIN SODIUM SCH MG: 40 INJECTION SUBCUTANEOUS at 08:14

## 2020-06-30 RX ADMIN — QUETIAPINE SCH MG: 25 TABLET, FILM COATED ORAL at 17:21

## 2020-06-30 RX ADMIN — AMOXICILLIN SCH MG: 250 CAPSULE ORAL at 12:47

## 2020-06-30 RX ADMIN — INSULIN HUMAN PRN UNIT: 100 INJECTION, SOLUTION PARENTERAL at 22:09

## 2020-06-30 RX ADMIN — QUETIAPINE SCH MG: 25 TABLET, FILM COATED ORAL at 08:14

## 2020-06-30 RX ADMIN — INSULIN HUMAN PRN UNIT: 100 INJECTION, SOLUTION PARENTERAL at 22:04

## 2020-06-30 RX ADMIN — AMOXICILLIN SCH MG: 250 CAPSULE ORAL at 21:16

## 2020-06-30 NOTE — NUR
MS RN NOTE



PATIENT BLOOD SUGAR . HELD REGULAR INSULIN PRN DOSE DUE TO PATIENT WITH POOR APPETITE 
AND HISTORY OF RECENT LOW BLOOD SUGARS.

## 2020-06-30 NOTE — NUR
MS RN NOTE



PATIENT IS IN BED RESTING COMFORTABLY. PATIENT IN NO ACUTE DISTRESS. NO SOB NOTED. PATIENT 
BREATHING IS EVEN AND UNLABORED. PATIENT NOTED WITH BILATERAL SOFT WRIST RESTRAINTS AND 
NOTED WITH GOOD CIRCULATION. PATIENT BED ALARM IS ON. PATIENT KEPT CLEAN, DRY AND 
COMFORTABLE THROUGHOUT SHIFT. PATIENT SAFETY PRECAUTIONS IN PLACE. PATIENT BED IS LOCKED AND 
IN LOWEST POSITION. CALL LIGHT WITHIN REACH. GAVE REPORT AND ENDORSED CARE TO BRADEN RN FOR 
JUAN.

## 2020-06-30 NOTE — NUR
RN NOTES,



PATIENT IN BED, ASLEEP AT THIS TIME, BUT AROUSABLE TO VERBAL STIMULI,  NO SIGNIFICANT CHANGE 
IN CONDITION DURING THE NIGHT, BREATHING EVEN AND UNLABORED, NO SOB/ACUTE DISTRESS NOTED, 
BED LOCKED IN IN LOW POSITION, CALL LIGHT W/I REACH, WILL ENDORSE CONTINUITY OF CARE TO 
ONCOMING NURSE.

## 2020-06-30 NOTE — NUR
TELE RN OPENING NOTE



RECEIVED PATIENT IN BED RESTING COMFORTABLY. PATIENT IN NO ACUTE DISTRESS. NO SOB NOTED. 
PATIENT BREATHING IS EVEN AND UNLABORED. PATIENT ON CARDIAC MONITORING READING SINUS 
BRADYCARDIA HR 52. PATIENT NOTED WITH BILATERAL SOFT WRIST RESTRAINTS AND NOTED WITH GOOD 
CIRCULATION. PATIENT BED ALARM IS ON. PATIENT SAFETY PRECAUTIONS IN PLACE. PATIENT BED IS 
LOCKED AND IN LOWEST POSITION. CALL LIGHT WITHIN REACH. WILL CONTINUE TO MONITOR.

## 2020-06-30 NOTE — NUR
MS RN CLOSING NOTE



PT AWAKE IN BED, ALERT AND ORIENTED X 1, CONFUSED, ON 02 VIA NC 2L/MIN, SATURATING WELL, 
RESPIRATIONS EVEN AND UNLABORED, NO SIGNS OF RESPIRATORY DISTRESS NOTED. LUCAS CATH INTACT, 
PATENT, WITH CLEAR YELLOW URINE. PATIENT NOTED WITH BILATERAL SOFT WRIST RESTRAINTS, NO 
BRUISES NO REDNESS NOTED, BILATERAL PULSES PRESENT. PATIENT BED ALARM IS ON. PATIENT SAFETY 
PRECAUTIONS IN PLACE. PATIENT BED IS LOCKED AND IN LOWEST POSITION. CALL LIGHT WITHIN REACH. 
WILL ENDORSE TO NOC SHIFT NURSE.

## 2020-06-30 NOTE — NUR
MS RN NOTE



CALL FROM LAB. PATIENT BLOOD SUGAR . INFORMED AND MADE AWARE TO HENRIQUE HILLIARD. NO 
NEW ORDERS AT THIS TIME. PATIENT IS STABLE, ALERT, AND RESPONSIVE TO TACTILE AND VERBAL 
STIMULI.

## 2020-06-30 NOTE — NUR
TELE RN OPENING NOTE



RECEIVED PATIENT IN BED AWAKE A/O TO SELF, WITH INTERMITTENT CONFUSION, MAKING NON SENSE 
PHRASES AT TIMES, AFEBRILE,  ON BILATERAL WRIST RESTRAINS, NO CIRCULATION COMPROMISED OR 
ABNORMALITY NOTED AT SITE,    BED LOCKED AND  LOWEST POSITION,  BED ALARM IS ON,  SAFETY 
PRECAUTIONS IN PLACE,   CALL LIGHT WITHIN REACH,  WILL CONTINUE TO MONITOR CLOSELY.

## 2020-07-01 VITALS — SYSTOLIC BLOOD PRESSURE: 137 MMHG | DIASTOLIC BLOOD PRESSURE: 90 MMHG

## 2020-07-01 VITALS — SYSTOLIC BLOOD PRESSURE: 127 MMHG | DIASTOLIC BLOOD PRESSURE: 83 MMHG

## 2020-07-01 VITALS — DIASTOLIC BLOOD PRESSURE: 82 MMHG | SYSTOLIC BLOOD PRESSURE: 147 MMHG

## 2020-07-01 VITALS — DIASTOLIC BLOOD PRESSURE: 95 MMHG | SYSTOLIC BLOOD PRESSURE: 142 MMHG

## 2020-07-01 LAB
BASOPHILS # BLD AUTO: 0 /CMM (ref 0–0.2)
BASOPHILS NFR BLD AUTO: 0 % (ref 0–2)
BUN SERPL-MCNC: 24 MG/DL (ref 7–18)
CALCIUM SERPL-MCNC: 8.6 MG/DL (ref 8.5–10.1)
CHLORIDE SERPL-SCNC: 109 MMOL/L (ref 98–107)
CO2 SERPL-SCNC: 22 MMOL/L (ref 21–32)
CREAT SERPL-MCNC: 1 MG/DL (ref 0.6–1.3)
EOSINOPHIL NFR BLD AUTO: 0 % (ref 0–6)
GLUCOSE SERPL-MCNC: 137 MG/DL (ref 74–106)
HCT VFR BLD AUTO: 40 % (ref 39–51)
HGB BLD-MCNC: 13.2 G/DL (ref 13.5–17.5)
LYMPHOCYTES NFR BLD AUTO: 0.9 /CMM (ref 0.8–4.8)
LYMPHOCYTES NFR BLD AUTO: 6.5 % (ref 20–44)
MAGNESIUM SERPL-MCNC: 2.3 MG/DL (ref 1.8–2.4)
MCHC RBC AUTO-ENTMCNC: 33 G/DL (ref 31–36)
MCV RBC AUTO: 85 FL (ref 80–96)
MONOCYTES NFR BLD AUTO: 0.5 /CMM (ref 0.1–1.3)
MONOCYTES NFR BLD AUTO: 3.7 % (ref 2–12)
NEUTROPHILS # BLD AUTO: 12.2 /CMM (ref 1.8–8.9)
NEUTROPHILS NFR BLD AUTO: 89.8 % (ref 43–81)
PHOSPHATE SERPL-MCNC: 2.8 MG/DL (ref 2.5–4.9)
PLATELET # BLD AUTO: 198 /CMM (ref 150–450)
POTASSIUM SERPL-SCNC: 4.2 MMOL/L (ref 3.5–5.1)
RBC # BLD AUTO: 4.77 MIL/UL (ref 4.5–6)
SODIUM SERPL-SCNC: 141 MMOL/L (ref 136–145)
WBC NRBC COR # BLD AUTO: 13.6 K/UL (ref 4.3–11)

## 2020-07-01 RX ADMIN — AMOXICILLIN SCH MG: 250 CAPSULE ORAL at 12:18

## 2020-07-01 RX ADMIN — QUETIAPINE SCH MG: 25 TABLET, FILM COATED ORAL at 12:18

## 2020-07-01 RX ADMIN — ENOXAPARIN SODIUM SCH MG: 40 INJECTION SUBCUTANEOUS at 08:10

## 2020-07-01 RX ADMIN — Medication SCH EACH: at 12:25

## 2020-07-01 RX ADMIN — Medication SCH EACH: at 08:11

## 2020-07-01 RX ADMIN — QUETIAPINE SCH MG: 25 TABLET, FILM COATED ORAL at 08:09

## 2020-07-01 RX ADMIN — PANTOPRAZOLE SODIUM SCH MG: 40 TABLET, DELAYED RELEASE ORAL at 08:09

## 2020-07-01 RX ADMIN — AMOXICILLIN SCH MG: 250 CAPSULE ORAL at 04:35

## 2020-07-01 NOTE — NUR
WOUND CARE CONSULT: PT PRESENTS WITH SACRAL INTACT DEEP TISSUE INJURY, PRESENT ON ADMISSION. 
PT IS VERY THIN AND BONY. PT IS INCONTINENT OF STOOL. PT IS UNCOOPERATIVE AND COMBATIVE AT 
TIMES. RECOMMENDATIONS MADE FOR SKIN PROTECTION AND WOUND CARE. DISCUSSED WITH NURSING 
STAFF. PT IS ON TOM ISOFLEX LOW AIRLOSS BED. WILL SEE PRN. MD IN AGREEMENT WITH PLAN OF 
CARE. 

-------------------------------------------------------------------------------

Addendum: 07/01/20 at 0846 by LAINE TORRES WNDNU

-------------------------------------------------------------------------------

Amended: Links added.

## 2020-07-01 NOTE — NUR
RN OPENING NOTES



RECEIVED PT. IN BED. NO ACUTE DISTRESS NOTED. PT. A&OX1. PT. L FA IV ACCESS INTACT, PATENT, 
FLUSHED WELL. PT. SAFETY MAINTAINED. CALL LIGHT WITHIN REACH. WILL CONTINUE TO MONITOR. 

-------------------------------------------------------------------------------

Addendum: 07/01/20 at 1204 by SREEDHAR GONCALVES RN

-------------------------------------------------------------------------------

AMEND- PT. ON ROOM AIR, SATURATING WELL AT 92%

## 2020-07-01 NOTE — NUR
RN NOTES,



PATIENT IN BED, ASLEEP AT THIS TIME, BUT AROUSABLE TO VERBAL STIMULI,  NO SIGNIFICANT CHANGE 
IN CONDITION DURING THE NIGHT, ON 2LPM VIA NC, BREATHING EVEN AND UNLABORED, NO SOB/ACUTE 
DISTRESS NOTED, BED LOCKED IN IN LOW POSITION, CALL LIGHT W/I REACH, WILL ENDORSE CONTINUITY 
OF CARE TO ONCOMING NURSE.

## 2020-07-01 NOTE — NUR
RN DISCHARGE NOTE



PT. DISCHARGED TO AMBULANCE. GETTING TRANSFERRED TO [facility]. GAVE REPORT TO 
JESSICA TAYLOR. PT. LEFT IN STABLE CONDITION; NO ACUTE DISTRESS NOTED.

## 2020-12-04 NOTE — NUR
Patient calling back on this request.  She states she spoke with two pharmacists and is asking if she could be prescribed 250MG tabs, one to take in the morning, and one to take at night instead of a 500MG capsule once daily.  She states she already takes other medications, and doesn't want to \"overload her system\" in the timeframe.  Please call to discuss, thank you!   Social Work Individual Therapy Note:



 met with patient today to provide brief individual supportive counseling. 
Patient continues to remain socially withdrawn and unable to engage in a meaningful 
conversation. Patient presents with lethargic affect and congruent mood. SW attempted to 
encourage patient to share his needs and join outside group, however, patient is unable to 
engage appropriately.  will remain available to patient and continue to provide 
supportive counseling as possible and needed.

## 2022-09-14 NOTE — NUR
RN OPENING NOTE



PT RECEIVED IN BED LAYING DOWN COMFORTABLY. PT IS CONFUSED. THERE IS NO S/S OF DISTRESS. PT 
HAS BUE SOFT RESTRAINT. PT IS ON 2L VIA NC SATING 98%, PT HAS UNLABORED BREATHING. PT HAVE 
RFA 20 G D5 1/2 NS AT 75 ML RUNNING. PT HAS LUCAS WITH LITTLE AMOUNT OF URINE LESS THAN 200 
ML.SAFETY MEASURES IN PLACE.BED LOCKED AND AT LOWEST  POSITION. SIDE RAILS X 2 UP. HOB 
ELEVATED. CALL LIGHT WITHIN REACH. WILL CONTINUE TO MONITOR. Detail Level: Detailed Detail Level: Zone